# Patient Record
Sex: MALE | Race: WHITE | NOT HISPANIC OR LATINO | Employment: OTHER | URBAN - METROPOLITAN AREA
[De-identification: names, ages, dates, MRNs, and addresses within clinical notes are randomized per-mention and may not be internally consistent; named-entity substitution may affect disease eponyms.]

---

## 2019-01-08 ENCOUNTER — OFFICE VISIT (OUTPATIENT)
Dept: FAMILY MEDICINE CLINIC | Facility: CLINIC | Age: 69
End: 2019-01-08

## 2019-01-08 VITALS
SYSTOLIC BLOOD PRESSURE: 150 MMHG | DIASTOLIC BLOOD PRESSURE: 92 MMHG | WEIGHT: 150.6 LBS | BODY MASS INDEX: 28.43 KG/M2 | HEART RATE: 56 BPM | HEIGHT: 61 IN | OXYGEN SATURATION: 97 % | TEMPERATURE: 98.1 F

## 2019-01-08 DIAGNOSIS — R35.0 BENIGN PROSTATIC HYPERPLASIA WITH URINARY FREQUENCY: Primary | ICD-10-CM

## 2019-01-08 DIAGNOSIS — F51.01 PRIMARY INSOMNIA: ICD-10-CM

## 2019-01-08 DIAGNOSIS — E55.9 VITAMIN D DEFICIENCY: ICD-10-CM

## 2019-01-08 DIAGNOSIS — Z86.79 HISTORY OF HYPERTENSION: ICD-10-CM

## 2019-01-08 DIAGNOSIS — N40.1 BENIGN PROSTATIC HYPERPLASIA WITH URINARY FREQUENCY: Primary | ICD-10-CM

## 2019-01-08 DIAGNOSIS — E78.5 HYPERLIPIDEMIA, UNSPECIFIED HYPERLIPIDEMIA TYPE: ICD-10-CM

## 2019-01-08 PROCEDURE — 99204 OFFICE O/P NEW MOD 45 MIN: CPT | Performed by: INTERNAL MEDICINE

## 2019-01-08 RX ORDER — ALFUZOSIN HYDROCHLORIDE 10 MG/1
10 TABLET, EXTENDED RELEASE ORAL NIGHTLY
COMMUNITY

## 2019-01-08 RX ORDER — PRAVASTATIN SODIUM 40 MG
40 TABLET ORAL NIGHTLY
COMMUNITY

## 2019-01-08 RX ORDER — FINASTERIDE 5 MG/1
5 TABLET, FILM COATED ORAL DAILY
COMMUNITY

## 2019-01-08 RX ORDER — GABAPENTIN 300 MG/1
300 CAPSULE ORAL NIGHTLY
COMMUNITY

## 2019-01-08 NOTE — PROGRESS NOTES
Chief Complaint:  [facility], htn, bph    HPI:  Elliot Jean Baptiste is a 68 y.o. male who presents today to [facility]. He has a history of bph, currently on combination therapy. Hx of htn, currently not on medication. He is taking a statin to prevent dementia per previous pcp. He has no complaints today. [facility]. His BP at home is usually 120-130/80.     ROS:  Constitutional: no fevers, night sweats or unexplained weight loss  Eyes: no vision changes  ENT: no runny nose, ear pain, sore throat  Cardio: no chest pain, palpitations  Pulm: no shortness of breath, wheezing, or cough  GI: no abdominal pain or changes in bowel movements  : no difficulty urinating  MSK: no difficulty ambulating, no joint pain  Neuro: no weakness, dizziness or headache  Psych: no trouble sleeping  Endo: no change in appetite      Past Medical History:   Diagnosis Date   • Arthritis    • Cancer (CMS/HCC)     skin   • Colon polyp    • Depression    • Enlarged prostate    • Gallstones    • History of medical problems     poor blood clotting    • Hypertension    • Kidney infection    • Kidney stone    • Migraine       Family History   Problem Relation Age of Onset   • Cancer Mother    • Hypertension Mother    • Migraines Mother    • Osteoporosis Mother    • Hypertension Father    • Hyperlipidemia Father    • Migraines Sister       Social History     Socioeconomic History   • Marital status:      Spouse name: Not on file   • Number of children: Not on file   • Years of education: Not on file   • Highest education level: Not on file   Social Needs   • Financial resource strain: Not on file   • Food insecurity - worry: Not on file   • Food insecurity - inability: Not on file   • Transportation needs - medical: Not on file   • Transportation needs - non-medical: Not on file   Occupational History   • Not on file   Tobacco Use   • Smoking status: Never Smoker   • Smokeless  tobacco: Never Used   Substance and Sexual Activity   • Alcohol use: Yes     Comment: SOCAIL    • Drug use: No   • Sexual activity: No   Other Topics Concern   • Not on file   Social History Narrative   • Not on file      No Known Allergies     There is no immunization history on file for this patient.     PE:  Vitals:    01/08/19 1343   BP: 150/92   Pulse: 56   Temp: 98.1 °F (36.7 °C)   SpO2: 97%        Gen Appearance: NAD  HEENT: Normocephalic, PERRLA, no thyromegaly, trache midline  Heart: RRR, normal S1 and S2, no murmur  Lungs: CTA b/l, no wheezing, no crackles  Abdomen: Soft, non-tender, non-distended, no guarding and BSx4  MSK: Moves all extremities well, normal gait, no peripheral edema  Pulses: Palpable and equal b/l  Lymph nodes: No palpable lymphadenopathy   Neuro: No focal deficits      Current Outpatient Medications   Medication Sig Dispense Refill   • alfuzosin (UROXATRAL) 10 MG 24 hr tablet Take 10 mg by mouth Every Night.     • Cholecalciferol (VITAMIN D3) 5000 units capsule capsule Take 5,000 Units by mouth Daily.     • finasteride (PROSCAR) 5 MG tablet Take 5 mg by mouth Daily.     • gabapentin (NEURONTIN) 300 MG capsule Take 300 mg by mouth Every Night.     • pravastatin (PRAVACHOL) 40 MG tablet Take 40 mg by mouth Every Night.       No current facility-administered medications for this visit.         Elliot was seen today for establish care.    Diagnoses and all orders for this visit:    Benign prostatic hyperplasia with urinary frequency  Well controlled on combination therapy. Will check PSA and prostate exam at medicare wellness exam.  Vitamin D deficiency  Will check level at wellness exam. He takes 5000 units daily.  Primary insomnia  Currently taking gabapentin for insomnia about once per week per [facility].   Hyperlipidemia, unspecified hyperlipidemia type  Pt states his cholesterol was good before taking statin but he is taking pravastatin for dementia prevention. Will stop statin  now, recheck in 3 months. No history of heart disease, smoking and no family hx of early heart disease.   Hypertension   He has a history of hypertension previously taking losartan. Slightly elevated today at 150/92. Recommend checking BP at home and at pharmacy over the next few months. He reports readings of 120-130 systolic of recent.     Return in about 3 months (around 4/8/2019) for Medicare Wellness.

## 2019-04-09 ENCOUNTER — OFFICE VISIT (OUTPATIENT)
Dept: FAMILY MEDICINE CLINIC | Facility: CLINIC | Age: 69
End: 2019-04-09

## 2019-04-09 ENCOUNTER — APPOINTMENT (OUTPATIENT)
Dept: LAB | Facility: HOSPITAL | Age: 69
End: 2019-04-09

## 2019-04-09 VITALS
SYSTOLIC BLOOD PRESSURE: 144 MMHG | DIASTOLIC BLOOD PRESSURE: 76 MMHG | TEMPERATURE: 97.1 F | BODY MASS INDEX: 28.25 KG/M2 | HEART RATE: 67 BPM | RESPIRATION RATE: 10 BRPM | HEIGHT: 61 IN | WEIGHT: 149.6 LBS | OXYGEN SATURATION: 99 %

## 2019-04-09 DIAGNOSIS — E55.9 VITAMIN D DEFICIENCY: ICD-10-CM

## 2019-04-09 DIAGNOSIS — Z00.00 PREVENTATIVE HEALTH CARE: Primary | ICD-10-CM

## 2019-04-09 DIAGNOSIS — Z12.5 PROSTATE CANCER SCREENING: ICD-10-CM

## 2019-04-09 DIAGNOSIS — R35.0 BENIGN PROSTATIC HYPERPLASIA WITH URINARY FREQUENCY: ICD-10-CM

## 2019-04-09 DIAGNOSIS — M62.838 MUSCLE SPASM: ICD-10-CM

## 2019-04-09 DIAGNOSIS — D17.9 LIPOMA, UNSPECIFIED SITE: ICD-10-CM

## 2019-04-09 DIAGNOSIS — N40.1 BENIGN PROSTATIC HYPERPLASIA WITH URINARY FREQUENCY: ICD-10-CM

## 2019-04-09 LAB
ALBUMIN SERPL-MCNC: 4.6 G/DL (ref 3.5–5.2)
ALBUMIN/GLOB SERPL: 1.5 G/DL
ALP SERPL-CCNC: 62 U/L (ref 39–117)
ALT SERPL W P-5'-P-CCNC: 15 U/L (ref 1–41)
ANION GAP SERPL CALCULATED.3IONS-SCNC: 11.9 MMOL/L
AST SERPL-CCNC: 17 U/L (ref 1–40)
BILIRUB SERPL-MCNC: 2.4 MG/DL (ref 0.2–1.2)
BUN BLD-MCNC: 24 MG/DL (ref 8–23)
BUN/CREAT SERPL: 16 (ref 7–25)
CALCIUM SPEC-SCNC: 10 MG/DL (ref 8.6–10.5)
CHLORIDE SERPL-SCNC: 102 MMOL/L (ref 98–107)
CHOLEST SERPL-MCNC: 197 MG/DL (ref 0–200)
CO2 SERPL-SCNC: 26.1 MMOL/L (ref 22–29)
CREAT BLD-MCNC: 1.5 MG/DL (ref 0.76–1.27)
DEPRECATED RDW RBC AUTO: 39.1 FL (ref 37–54)
ERYTHROCYTE [DISTWIDTH] IN BLOOD BY AUTOMATED COUNT: 12 % (ref 12.3–15.4)
GFR SERPL CREATININE-BSD FRML MDRD: 46 ML/MIN/1.73
GLOBULIN UR ELPH-MCNC: 3 GM/DL
GLUCOSE BLD-MCNC: 107 MG/DL (ref 65–99)
HBA1C MFR BLD: 5.5 % (ref 4.8–5.6)
HCT VFR BLD AUTO: 42.5 % (ref 37.5–51)
HDLC SERPL-MCNC: 53 MG/DL (ref 40–60)
HGB BLD-MCNC: 14.3 G/DL (ref 13–17.7)
LDLC SERPL CALC-MCNC: 113 MG/DL (ref 0–100)
LDLC/HDLC SERPL: 2.12 {RATIO}
MCH RBC QN AUTO: 29.9 PG (ref 26.6–33)
MCHC RBC AUTO-ENTMCNC: 33.6 G/DL (ref 31.5–35.7)
MCV RBC AUTO: 88.7 FL (ref 79–97)
PLATELET # BLD AUTO: 193 10*3/MM3 (ref 140–450)
PMV BLD AUTO: 11.5 FL (ref 6–12)
POTASSIUM BLD-SCNC: 5 MMOL/L (ref 3.5–5.2)
PROT SERPL-MCNC: 7.6 G/DL (ref 6–8.5)
PSA SERPL-MCNC: 4.25 NG/ML (ref 0–4)
RBC # BLD AUTO: 4.79 10*6/MM3 (ref 4.14–5.8)
SODIUM BLD-SCNC: 140 MMOL/L (ref 136–145)
TRIGL SERPL-MCNC: 157 MG/DL (ref 0–150)
VLDLC SERPL-MCNC: 31.4 MG/DL (ref 5–40)
WBC NRBC COR # BLD: 5.82 10*3/MM3 (ref 3.4–10.8)

## 2019-04-09 PROCEDURE — 83036 HEMOGLOBIN GLYCOSYLATED A1C: CPT | Performed by: INTERNAL MEDICINE

## 2019-04-09 PROCEDURE — G0103 PSA SCREENING: HCPCS | Performed by: INTERNAL MEDICINE

## 2019-04-09 PROCEDURE — 85027 COMPLETE CBC AUTOMATED: CPT | Performed by: INTERNAL MEDICINE

## 2019-04-09 PROCEDURE — 80053 COMPREHEN METABOLIC PANEL: CPT | Performed by: INTERNAL MEDICINE

## 2019-04-09 PROCEDURE — 36415 COLL VENOUS BLD VENIPUNCTURE: CPT | Performed by: INTERNAL MEDICINE

## 2019-04-09 PROCEDURE — 82306 VITAMIN D 25 HYDROXY: CPT | Performed by: INTERNAL MEDICINE

## 2019-04-09 PROCEDURE — 80061 LIPID PANEL: CPT | Performed by: INTERNAL MEDICINE

## 2019-04-09 PROCEDURE — G0439 PPPS, SUBSEQ VISIT: HCPCS | Performed by: INTERNAL MEDICINE

## 2019-04-09 RX ORDER — CYCLOBENZAPRINE HCL 10 MG
10 TABLET ORAL 2 TIMES DAILY PRN
Qty: 15 TABLET | Refills: 0 | Status: SHIPPED | OUTPATIENT
Start: 2019-04-09

## 2019-04-09 NOTE — PROGRESS NOTES
QUICK REFERENCE INFORMATION:  The ABCs of the Annual Wellness Visit  Elliot Jean Baptiste is a 69 [facility]  [facility]icare Wellness-subsequent (3 MTH )  .     Medicare Subsequent Wellness Visit    Chief Complaint   Patient presents with   • Medicare Wellness-subsequent     3 MTH         HPI   Complaining of right-sided leg cramping and pain.  Ongoing for 3 weeks now.  No improvement with anti-inflammatories or muscle relaxers.  Worsens with activity.  ROS:  Constitutional: no fevers, night sweats or unexplained weight loss  Eyes: no vision changes  ENT: no runny nose, ear pain, sore throat  Cardio: no chest pain, palpitations  Pulm: no shortness of breath, wheezing, or cough  GI: no abdominal pain or changes in bowel movements  : no difficulty urinating  MSK: no difficulty ambulating, no joint pain  Neuro: no weakness, dizziness or headache  Psych: no trouble sleeping  Endo: no change in appetite     Past Medical History:   Diagnosis Date   • Arthritis    • Cancer (CMS/HCC)     skin   • Colon polyp    • Depression    • Enlarged prostate    • Gallstones    • History of medical problems     poor blood clotting    • Hypertension    • Kidney infection    • Kidney stone    • Migraine         Past Surgical History:   Procedure Laterality Date   • APPENDECTOMY     • CATARACT EXTRACTION Bilateral    • GALLBLADDER SURGERY     • HEMORRHOIDECTOMY     • KIDNEY STONE SURGERY     • KNEE CARTILAGE SURGERY Left    • ULNAR NERVE REPAIR Left        Family History   Problem Relation Age of Onset   • Cancer Mother    • Hypertension Mother    • Migraines Mother    • Osteoporosis Mother    • Hypertension Father    • Hyperlipidemia Father    • Migraines Sister         Social History     Socioeconomic History   • Marital status:      Spouse name: Not on file   • Number of children: Not on file   • Years of education: Not on file   • Highest education level: Not on file   Tobacco Use   •  "Smoking status: Never Smoker   • Smokeless tobacco: Never Used   Substance and Sexual Activity   • Alcohol use: Yes     Comment: SOCAIL    • Drug use: No   • Sexual activity: No        Current Outpatient Medications   Medication Sig Dispense Refill   • alfuzosin (UROXATRAL) 10 MG 24 hr tablet Take 10 mg by mouth Every Night.     • Cholecalciferol (VITAMIN D3) 5000 units capsule capsule Take 5,000 Units by mouth Daily.     • finasteride (PROSCAR) 5 MG tablet Take 5 mg by mouth Daily.     • gabapentin (NEURONTIN) 300 MG capsule Take 300 mg by mouth Every Night.     • pravastatin (PRAVACHOL) 40 MG tablet Take 40 mg by mouth Every Night.     • cyclobenzaprine (FLEXERIL) 10 MG tablet Take 1 tablet by mouth 2 (Two) Times a Day As Needed for Muscle Spasms. 15 tablet 0     No current facility-administered medications for this visit.         No Known Allergies     Immunization History   Administered Date(s) Administered   • Pneumococcal Conjugate 13-Valent (PCV13) 11/04/2014   • Zostavax 05/01/2014        The following portions of the patient's history were reviewed and updated as appropriate: allergies, current medications, past family history, past medical history, past social history, past surgical history and problem list.      Objective    Visit Vitals  /76 (BP Location: Left arm, Patient Position: Sitting, Cuff Size: Adult)   Pulse 67   Temp 97.1 °F (36.2 °C) (Temporal)   Resp 10   Ht 154.9 cm (61\")   Wt 67.9 kg (149 lb 9.6 oz)   SpO2 99%   BMI 28.27 kg/m²        Physical Exam  Gen Appearance: NAD  HEENT: Normocephalic, PERRLA, no thyromegaly, trache midline  Heart: RRR, normal S1 and S2, no murmur  Lungs: CTA b/l, no wheezing, no crackles  Abdomen: Soft, non-tender, non-distended, no guarding and BSx4  MSK: Moves all extremities well, normal gait, no peripheral edema  Pulses: Palpable and equal b/l  Lymph nodes: No palpable lymphadenopathy   Neuro: No focal deficits       HEALTH RISK " ASSESSMENT    1950    Recent Hospitalizations:  No hospitalization(s) within the last year..      Current Medical Providers:  Patient Care Team:  Dandy Teran DO as PCP - General (Internal Medicine)      Smoking Status:  Social History     Tobacco Use   Smoking Status Never Smoker   Smokeless Tobacco Never Used       Alcohol Consumption:  Social History     Substance and Sexual Activity   Alcohol Use Yes    Comment: SOCAIL        Depression Screen:   PHQ-2/PHQ-9 Depression Screening 4/9/2019   Little interest or pleasure in doing things 0   Feeling down, depressed, or hopeless 0   Total Score 0       Health Habits and Functional and Cognitive Screening:  Functional & Cognitive Status 4/9/2019   Do you have difficulty preparing food and eating? No   Do you have difficulty bathing yourself, getting dressed or grooming yourself? No   Do you have difficulty using the toilet? No   Do you have difficulty moving around from place to place? No   Do you have trouble with steps or getting out of a bed or a chair? No   In the past year have you fallen or experienced a near fall? No   Current Diet Well Balanced Diet   Dental Exam Up to date   Eye Exam Up to date   Exercise (times per week) 7 times per week   Current Exercise Activities Include Aerobics   Do you need help using the phone?  No   Are you deaf or do you have serious difficulty hearing?  No   Do you need help with transportation? No   Do you need help shopping? No   Do you need help preparing meals?  No   Do you need help with housework?  No   Do you need help with laundry? No   Do you need help taking your medications? No   Do you need help managing money? No   Do you ever drive or ride in a car without wearing a seat belt? No   Have you felt unusual stress, anger or loneliness in the last month? No   Who do you live with? Spouse   If you need help, do you have trouble finding someone available to you? No   Have you been bothered in the last four  weeks by sexual problems? No   Do you have difficulty concentrating, remembering or making decisions? No         Does the patient have evidence of cognitive impairment? No    Aspirin use counseling? Does not need ASA (and currently is not on it)      Recent Lab Results:  CMP:     Lipid Panel:     HbA1c:       Visual Acuity:  No exam data present    Age-appropriate Screening Schedule:  Refer to the list below for future screening recommendations based on patient's age, sex and/or medical conditions. Orders for these recommended tests are listed in the plan section. The patient has been provided with a written plan.    Health Maintenance   Topic Date Due   • TDAP/TD VACCINES (1 - Tdap) 02/16/1967   • ZOSTER VACCINE (2 of 3) 06/26/2014   • PNEUMOCOCCAL VACCINES (65+ LOW/MEDIUM RISK) (2 of 2 - PPSV23) 11/04/2015   • LIPID PANEL  01/08/2019   • COLONOSCOPY  01/08/2019   • INFLUENZA VACCINE  08/01/2019          Advance Care Planning:  Patient has an advance directive - a copy has not been provided. Have asked the patient to send this to us to add to record    Identification of Risk Factors:  Risk factors include: Cardiovascular risk;  Patient advised to follow heart healthy diet to help decrease cardiovascular risk .    Compared to one year ago, the patient feels his physical health is the same.  Compared to one year ago, the patient feels his mental health is the same.  Reviewed use of high risk medication in the elderly: yes  Reviewed for potential of harmful drug interactions in the elderly: not applicable    Elliot was seen today for medicare wellness-subsequent.    Diagnoses and all orders for this visit:    Preventative health care  -     CBC (No Diff); Future  -     Comprehensive Metabolic Panel; Future  -     Lipid Panel; Future  -     Hemoglobin A1c; Future  -     CBC (No Diff)  -     Comprehensive Metabolic Panel  -     Lipid Panel  -     Hemoglobin A1c    Vitamin D deficiency  -     Vitamin D 25 Hydroxy;  Future  -     Vitamin D 25 Hydroxy    Benign prostatic hyperplasia with urinary frequency  Stable.  Continue current medication.  Prostate cancer screening  -     PSA SCREENING; Future  -     PSA SCREENING    Muscle spasm  -     cyclobenzaprine (FLEXERIL) 10 MG tablet; Take 1 tablet by mouth 2 (Two) Times a Day As Needed for Muscle Spasms.  -     Ambulatory Referral to Physical Therapy Evaluate and treat    Lipoma, unspecified site  -     Ambulatory Referral to General Surgery          Patient Self-Management and Personalized Health Advice  The patient has been provided with information about: diet, exercise, weight management and prevention of cardiac or vascular disease and preventive services including:   · Cardiovascular Disease Screening Tests (may do this order every 5 years in beneficiaries without signs or symptoms of cardiovascular disease).      Follow Up:  Return in about 1 year (around 4/9/2020) for Medicare Wellness.     An After Visit Summary and PPPS with all of these plans were given to the patient.

## 2019-04-10 LAB — 25(OH)D3 SERPL-MCNC: 89.5 NG/ML (ref 30–100)

## 2019-05-08 ENCOUNTER — LAB REQUISITION (OUTPATIENT)
Dept: LAB | Facility: HOSPITAL | Age: 69
End: 2019-05-08

## 2019-05-08 DIAGNOSIS — M79.9 SOFT TISSUE DISORDER: ICD-10-CM

## 2019-05-08 PROCEDURE — 88304 TISSUE EXAM BY PATHOLOGIST: CPT | Performed by: SURGERY

## 2019-05-09 LAB
CYTO UR: NORMAL
LAB AP CASE REPORT: NORMAL
LAB AP CLINICAL INFORMATION: NORMAL
PATH REPORT.FINAL DX SPEC: NORMAL
PATH REPORT.GROSS SPEC: NORMAL

## 2023-09-12 ENCOUNTER — TRANSCRIBE ORDERS (OUTPATIENT)
Dept: ADMINISTRATIVE | Facility: HOSPITAL | Age: 73
End: 2023-09-12
Payer: MEDICARE

## 2023-09-12 DIAGNOSIS — N28.89 OTHER SPECIFIED DISORDERS OF KIDNEY AND URETER: Primary | ICD-10-CM

## 2023-10-11 ENCOUNTER — OFFICE VISIT (OUTPATIENT)
Dept: FAMILY MEDICINE CLINIC | Facility: CLINIC | Age: 73
End: 2023-10-11
Payer: MEDICARE

## 2023-10-11 ENCOUNTER — LAB (OUTPATIENT)
Dept: LAB | Facility: HOSPITAL | Age: 73
End: 2023-10-11
Payer: MEDICARE

## 2023-10-11 VITALS
SYSTOLIC BLOOD PRESSURE: 138 MMHG | HEART RATE: 66 BPM | BODY MASS INDEX: 27.83 KG/M2 | HEIGHT: 61 IN | OXYGEN SATURATION: 97 % | DIASTOLIC BLOOD PRESSURE: 78 MMHG | WEIGHT: 147.4 LBS

## 2023-10-11 DIAGNOSIS — R35.0 BENIGN PROSTATIC HYPERPLASIA WITH URINARY FREQUENCY: ICD-10-CM

## 2023-10-11 DIAGNOSIS — Z23 INFLUENZA VACCINE NEEDED: ICD-10-CM

## 2023-10-11 DIAGNOSIS — N40.1 BENIGN PROSTATIC HYPERPLASIA WITH URINARY FREQUENCY: ICD-10-CM

## 2023-10-11 DIAGNOSIS — Z13.6 SCREENING FOR CARDIOVASCULAR CONDITION: ICD-10-CM

## 2023-10-11 DIAGNOSIS — N28.89 KIDNEY MASS: ICD-10-CM

## 2023-10-11 DIAGNOSIS — R79.9 ABNORMAL FINDING OF BLOOD CHEMISTRY, UNSPECIFIED: ICD-10-CM

## 2023-10-11 DIAGNOSIS — Z12.11 COLON CANCER SCREENING: ICD-10-CM

## 2023-10-11 DIAGNOSIS — Z00.00 MEDICARE ANNUAL WELLNESS VISIT, SUBSEQUENT: Primary | ICD-10-CM

## 2023-10-11 DIAGNOSIS — E55.9 VITAMIN D DEFICIENCY: ICD-10-CM

## 2023-10-11 DIAGNOSIS — E78.5 DYSLIPIDEMIA: ICD-10-CM

## 2023-10-11 LAB
25(OH)D3 SERPL-MCNC: 54.3 NG/ML (ref 30–100)
ALBUMIN SERPL-MCNC: 4.2 G/DL (ref 3.5–5.2)
ALBUMIN/GLOB SERPL: 1.6 G/DL
ALP SERPL-CCNC: 68 U/L (ref 39–117)
ALT SERPL W P-5'-P-CCNC: 16 U/L (ref 1–41)
ANION GAP SERPL CALCULATED.3IONS-SCNC: 8.4 MMOL/L (ref 5–15)
AST SERPL-CCNC: 19 U/L (ref 1–40)
BASOPHILS # BLD AUTO: 0.04 10*3/MM3 (ref 0–0.2)
BASOPHILS NFR BLD AUTO: 1.1 % (ref 0–1.5)
BILIRUB SERPL-MCNC: 1.4 MG/DL (ref 0–1.2)
BILIRUB UR QL STRIP: NEGATIVE
BUN SERPL-MCNC: 21 MG/DL (ref 8–23)
BUN/CREAT SERPL: 14.2 (ref 7–25)
CALCIUM SPEC-SCNC: 9.7 MG/DL (ref 8.6–10.5)
CHLORIDE SERPL-SCNC: 107 MMOL/L (ref 98–107)
CHOLEST SERPL-MCNC: 141 MG/DL (ref 0–200)
CLARITY UR: CLEAR
CO2 SERPL-SCNC: 25.6 MMOL/L (ref 22–29)
COLOR UR: YELLOW
CREAT SERPL-MCNC: 1.48 MG/DL (ref 0.76–1.27)
DEPRECATED RDW RBC AUTO: 39.2 FL (ref 37–54)
EGFRCR SERPLBLD CKD-EPI 2021: 49 ML/MIN/1.73
EOSINOPHIL # BLD AUTO: 0.17 10*3/MM3 (ref 0–0.4)
EOSINOPHIL NFR BLD AUTO: 4.7 % (ref 0.3–6.2)
ERYTHROCYTE [DISTWIDTH] IN BLOOD BY AUTOMATED COUNT: 12 % (ref 12.3–15.4)
GLOBULIN UR ELPH-MCNC: 2.7 GM/DL
GLUCOSE SERPL-MCNC: 125 MG/DL (ref 65–99)
GLUCOSE UR STRIP-MCNC: NEGATIVE MG/DL
HBA1C MFR BLD: 5.8 % (ref 4.8–5.6)
HCT VFR BLD AUTO: 39.1 % (ref 37.5–51)
HDLC SERPL-MCNC: 53 MG/DL (ref 40–60)
HGB BLD-MCNC: 13.8 G/DL (ref 13–17.7)
HGB UR QL STRIP.AUTO: NEGATIVE
HOLD SPECIMEN: NORMAL
IMM GRANULOCYTES # BLD AUTO: 0.01 10*3/MM3 (ref 0–0.05)
IMM GRANULOCYTES NFR BLD AUTO: 0.3 % (ref 0–0.5)
KETONES UR QL STRIP: NEGATIVE
LDLC SERPL CALC-MCNC: 70 MG/DL (ref 0–100)
LDLC/HDLC SERPL: 1.28 {RATIO}
LEUKOCYTE ESTERASE UR QL STRIP.AUTO: NEGATIVE
LYMPHOCYTES # BLD AUTO: 1.35 10*3/MM3 (ref 0.7–3.1)
LYMPHOCYTES NFR BLD AUTO: 37.3 % (ref 19.6–45.3)
MCH RBC QN AUTO: 31.4 PG (ref 26.6–33)
MCHC RBC AUTO-ENTMCNC: 35.3 G/DL (ref 31.5–35.7)
MCV RBC AUTO: 89.1 FL (ref 79–97)
MONOCYTES # BLD AUTO: 0.33 10*3/MM3 (ref 0.1–0.9)
MONOCYTES NFR BLD AUTO: 9.1 % (ref 5–12)
NEUTROPHILS NFR BLD AUTO: 1.72 10*3/MM3 (ref 1.7–7)
NEUTROPHILS NFR BLD AUTO: 47.5 % (ref 42.7–76)
NITRITE UR QL STRIP: NEGATIVE
NRBC BLD AUTO-RTO: 0 /100 WBC (ref 0–0.2)
PH UR STRIP.AUTO: 5.5 [PH] (ref 5–8)
PLATELET # BLD AUTO: 165 10*3/MM3 (ref 140–450)
PMV BLD AUTO: 10.9 FL (ref 6–12)
POTASSIUM SERPL-SCNC: 4.8 MMOL/L (ref 3.5–5.2)
PROT SERPL-MCNC: 6.9 G/DL (ref 6–8.5)
PROT UR QL STRIP: NEGATIVE
RBC # BLD AUTO: 4.39 10*6/MM3 (ref 4.14–5.8)
SODIUM SERPL-SCNC: 141 MMOL/L (ref 136–145)
SP GR UR STRIP: 1.02 (ref 1–1.03)
TRIGL SERPL-MCNC: 100 MG/DL (ref 0–150)
UROBILINOGEN UR QL STRIP: NORMAL
VLDLC SERPL-MCNC: 18 MG/DL (ref 5–40)
WBC NRBC COR # BLD: 3.62 10*3/MM3 (ref 3.4–10.8)

## 2023-10-11 PROCEDURE — 80053 COMPREHEN METABOLIC PANEL: CPT | Performed by: INTERNAL MEDICINE

## 2023-10-11 PROCEDURE — 83036 HEMOGLOBIN GLYCOSYLATED A1C: CPT | Performed by: INTERNAL MEDICINE

## 2023-10-11 PROCEDURE — 80061 LIPID PANEL: CPT | Performed by: INTERNAL MEDICINE

## 2023-10-11 PROCEDURE — G0439 PPPS, SUBSEQ VISIT: HCPCS | Performed by: INTERNAL MEDICINE

## 2023-10-11 PROCEDURE — G0008 ADMIN INFLUENZA VIRUS VAC: HCPCS | Performed by: INTERNAL MEDICINE

## 2023-10-11 PROCEDURE — 1170F FXNL STATUS ASSESSED: CPT | Performed by: INTERNAL MEDICINE

## 2023-10-11 PROCEDURE — 1160F RVW MEDS BY RX/DR IN RCRD: CPT | Performed by: INTERNAL MEDICINE

## 2023-10-11 PROCEDURE — 90662 IIV NO PRSV INCREASED AG IM: CPT | Performed by: INTERNAL MEDICINE

## 2023-10-11 PROCEDURE — 81003 URINALYSIS AUTO W/O SCOPE: CPT | Performed by: INTERNAL MEDICINE

## 2023-10-11 PROCEDURE — 85025 COMPLETE CBC W/AUTO DIFF WBC: CPT | Performed by: INTERNAL MEDICINE

## 2023-10-11 PROCEDURE — 1159F MED LIST DOCD IN RCRD: CPT | Performed by: INTERNAL MEDICINE

## 2023-10-11 PROCEDURE — 82306 VITAMIN D 25 HYDROXY: CPT | Performed by: INTERNAL MEDICINE

## 2023-10-11 RX ORDER — PRAVASTATIN SODIUM 40 MG
40 TABLET ORAL NIGHTLY
Qty: 90 TABLET | Refills: 3 | Status: SHIPPED | OUTPATIENT
Start: 2023-10-11

## 2023-10-11 RX ORDER — TAMSULOSIN HYDROCHLORIDE 0.4 MG/1
1 CAPSULE ORAL DAILY
COMMUNITY
Start: 2021-06-15

## 2023-10-11 RX ORDER — CEPHALEXIN 500 MG/1
500 CAPSULE ORAL 2 TIMES DAILY
COMMUNITY
Start: 2023-10-09

## 2023-10-11 NOTE — PROGRESS NOTES
The ABCs of the Annual Wellness Visit  Subsequent Medicare Wellness Visit    Subjective      Elliot Jean Baptiste is a 73 y.o. male who presents for a Subsequent Medicare Wellness Visit.    The following portions of the patient's history were reviewed and   updated as appropriate: allergies, current medications, past family history, past medical history, past social history, past surgical history, and problem list.    Compared to one year ago, the patient feels his physical   health is the same.    Compared to one year ago, the patient feels his mental   health is the same.    Recent Hospitalizations:  He was not admitted to the hospital during the last year.       Current Medical Providers:  Patient Care Team:  Dandy Teran,  as PCP - General (Internal Medicine)    Outpatient Medications Prior to Visit   Medication Sig Dispense Refill    alfuzosin (UROXATRAL) 10 MG 24 hr tablet Take 1 tablet by mouth Every Night.      cephalexin (KEFLEX) 500 MG capsule Take 1 capsule by mouth 2 (Two) Times a Day.      Cholecalciferol (VITAMIN D3) 5000 units capsule capsule Take 1 capsule by mouth Daily.      finasteride (PROSCAR) 5 MG tablet Take 1 tablet by mouth Daily.      tamsulosin (FLOMAX) 0.4 MG capsule 24 hr capsule Take 1 capsule by mouth Daily.      pravastatin (PRAVACHOL) 40 MG tablet Take 1 tablet by mouth Every Night.      clotrimazole-betamethasone (LOTRISONE) 1-0.05 % cream Apply  topically to the appropriate area as directed 2 (Two) Times a Day. 30 g 0    cyclobenzaprine (FLEXERIL) 10 MG tablet Take 1 tablet by mouth 2 (Two) Times a Day As Needed for Muscle Spasms. 15 tablet 0    gabapentin (NEURONTIN) 300 MG capsule Take 1 capsule by mouth Every Night.      predniSONE (DELTASONE) 10 MG tablet Daily taper - 6/5/4/3/2/1 21 tablet 0     No facility-administered medications prior to visit.       No opioid medication identified on active medication list. I have reviewed chart for other potential  high risk  "medication/s and harmful drug interactions in the elderly.        Aspirin is not on active medication list.  Aspirin use is not indicated based on review of current medical condition/s. Risk of harm outweighs potential benefits.  .    Patient Active Problem List   Diagnosis    Benign prostatic hyperplasia with urinary frequency    Vitamin D deficiency    Primary insomnia    History of hypertension     Advance Care Planning   Advance Care Planning     Advance Directive is not on file.  ACP discussion was held with the patient during this visit. Patient does not have an advance directive, declines further assistance.     Objective    Vitals:    10/11/23 1013   BP: 138/78   BP Location: Left arm   Patient Position: Sitting   Cuff Size: Adult   Pulse: 66   SpO2: 97%   Weight: 66.9 kg (147 lb 6.4 oz)   Height: 154.9 cm (61\")     Estimated body mass index is 27.85 kg/mý as calculated from the following:    Height as of this encounter: 154.9 cm (61\").    Weight as of this encounter: 66.9 kg (147 lb 6.4 oz).    BMI is >= 25 and <30. (Overweight) The following options were offered after discussion;: exercise counseling/recommendations and nutrition counseling/recommendations      Does the patient have evidence of cognitive impairment?   No            HEALTH RISK ASSESSMENT    Smoking Status:  Social History     Tobacco Use   Smoking Status Never   Smokeless Tobacco Never     Alcohol Consumption:  Social History     Substance and Sexual Activity   Alcohol Use Yes    Comment: SOCAIL      Fall Risk Screen:    STEADI Fall Risk Assessment was completed, and patient is at LOW risk for falls.Assessment completed on:10/11/2023    Depression Screening:      10/11/2023    10:14 AM   PHQ-2/PHQ-9 Depression Screening   Little Interest or Pleasure in Doing Things 0-->not at all   Feeling Down, Depressed or Hopeless 0-->not at all   PHQ-9: Brief Depression Severity Measure Score 0       Health Habits and Functional and Cognitive " Screening:      10/11/2023    10:00 AM   Functional & Cognitive Status   Do you have difficulty preparing food and eating? No   Do you have difficulty bathing yourself, getting dressed or grooming yourself? No   Do you have difficulty using the toilet? No   Do you have difficulty moving around from place to place? No   Do you have trouble with steps or getting out of a bed or a chair? No   Current Diet Well Balanced Diet   Dental Exam Up to date   Eye Exam Up to date   Exercise (times per week) 3 times per week   Current Exercises Include Aerobics;Swimming   Do you need help using the phone?  No   Are you deaf or do you have serious difficulty hearing?  Yes   Do you need help to go to places out of walking distance? No   Do you need help shopping? No   Do you need help preparing meals?  No   Do you need help with housework?  No   Do you need help with laundry? No   Do you need help taking your medications? No   Do you need help managing money? No   Do you ever drive or ride in a car without wearing a seat belt? Yes   Have you felt unusual stress, anger or loneliness in the last month? No   Who do you live with? Spouse   If you need help, do you have trouble finding someone available to you? No   Have you been bothered in the last four weeks by sexual problems? No   Do you have difficulty concentrating, remembering or making decisions? No       Age-appropriate Screening Schedule:  Refer to the list below for future screening recommendations based on patient's age, sex and/or medical conditions. Orders for these recommended tests are listed in the plan section. The patient has been provided with a written plan.    Health Maintenance   Topic Date Due    BMI FOLLOWUP  Never done    ZOSTER VACCINE (2 of 3) 06/26/2014    HEPATITIS C SCREENING  Never done    ANNUAL WELLNESS VISIT  04/09/2020    LIPID PANEL  04/09/2020    COVID-19 Vaccine (2 - 2023-24 season) 12/31/2023 (Originally 9/1/2023)    Pneumococcal Vaccine 65+ (2 -  PPSV23 or PCV20) 10/11/2024 (Originally 11/4/2015)    TDAP/TD VACCINES (1 - Tdap) 10/11/2024 (Originally 2/16/1969)    COLORECTAL CANCER SCREENING  02/26/2028    INFLUENZA VACCINE  Completed                Physical Exam  Gen Appearance: NAD  HEENT: Normocephalic, PERRLA, no thyromegaly, trache midline  Heart: RRR, normal S1 and S2, no murmur  Lungs: CTA b/l, no wheezing, no crackles  Abdomen: Soft, non-tender, non-distended, no guarding and BSx4  MSK: Moves all extremities well, normal gait, no peripheral edema  Pulses: Palpable and equal b/l  Lymph nodes: No palpable lymphadenopathy   Neuro: No focal deficits     CMS Preventative Services Quick Reference  Risk Factors Identified During Encounter:    None Identified    The above risks/problems have been discussed with the patient.  Pertinent information has been shared with the patient in the After Visit Summary.    Diagnoses and all orders for this visit:    1. Medicare annual wellness visit, subsequent (Primary)  Counseled on healthy weight, nutrition, physical activity, cancer screening, and immunizations.    2. Influenza vaccine needed  -     Fluzone High-Dose 65+yrs (3509-3361)    3. Kidney mass  -     CBC & Differential; Future  -     Comprehensive Metabolic Panel; Future  -     Hemoglobin A1c; Future  -     Lipid Panel; Future  -     Urinalysis With Culture If Indicated - Urine, Clean Catch; Future  -     Vitamin D,25-Hydroxy; Future  -     CBC & Differential  -     Comprehensive Metabolic Panel  -     Hemoglobin A1c  -     Lipid Panel  -     Urinalysis With Culture If Indicated - Urine, Clean Catch  -     Vitamin D,25-Hydroxy    4. Benign prostatic hyperplasia with urinary frequency  -     CBC & Differential; Future  -     Comprehensive Metabolic Panel; Future  -     Hemoglobin A1c; Future  -     Lipid Panel; Future  -     Urinalysis With Culture If Indicated - Urine, Clean Catch; Future  -     Vitamin D,25-Hydroxy; Future  -     CBC & Differential  -      Comprehensive Metabolic Panel  -     Hemoglobin A1c  -     Lipid Panel  -     Urinalysis With Culture If Indicated - Urine, Clean Catch  -     Vitamin D,25-Hydroxy    5. Vitamin D deficiency  -     CBC & Differential; Future  -     Comprehensive Metabolic Panel; Future  -     Hemoglobin A1c; Future  -     Lipid Panel; Future  -     Urinalysis With Culture If Indicated - Urine, Clean Catch; Future  -     Vitamin D,25-Hydroxy; Future  -     CBC & Differential  -     Comprehensive Metabolic Panel  -     Hemoglobin A1c  -     Lipid Panel  -     Urinalysis With Culture If Indicated - Urine, Clean Catch  -     Vitamin D,25-Hydroxy    6. Screening for cardiovascular condition  -     CBC & Differential; Future  -     Comprehensive Metabolic Panel; Future  -     Hemoglobin A1c; Future  -     Lipid Panel; Future  -     Urinalysis With Culture If Indicated - Urine, Clean Catch; Future  -     Vitamin D,25-Hydroxy; Future  -     CBC & Differential  -     Comprehensive Metabolic Panel  -     Hemoglobin A1c  -     Lipid Panel  -     Urinalysis With Culture If Indicated - Urine, Clean Catch  -     Vitamin D,25-Hydroxy    7. Abnormal finding of blood chemistry, unspecified  -     Hemoglobin A1c; Future  -     Hemoglobin A1c    8. Colon cancer screening    9. Dyslipidemia  -     pravastatin (PRAVACHOL) 40 MG tablet; Take 1 tablet by mouth Every Night.  Dispense: 90 tablet; Refill: 3        Follow Up:   Next Medicare Wellness visit to be scheduled in 1 year.      An After Visit Summary and PPPS were made available to the patient.

## 2023-10-12 ENCOUNTER — PATIENT ROUNDING (BHMG ONLY) (OUTPATIENT)
Dept: FAMILY MEDICINE CLINIC | Facility: CLINIC | Age: 73
End: 2023-10-12
Payer: MEDICARE

## 2023-10-13 ENCOUNTER — HOSPITAL ENCOUNTER (OUTPATIENT)
Dept: MRI IMAGING | Facility: HOSPITAL | Age: 73
Discharge: HOME | End: 2023-10-13
Admitting: UROLOGY
Payer: MEDICARE

## 2023-10-13 DIAGNOSIS — N28.89 OTHER SPECIFIED DISORDERS OF KIDNEY AND URETER: ICD-10-CM

## 2023-10-13 PROCEDURE — 0 GADOBENATE DIMEGLUMINE 529 MG/ML SOLUTION: Performed by: UROLOGY

## 2023-10-13 PROCEDURE — A9577 INJ MULTIHANCE: HCPCS | Performed by: UROLOGY

## 2023-10-13 PROCEDURE — 74183 MRI ABD W/O CNTR FLWD CNTR: CPT

## 2023-10-13 RX ADMIN — GADOBENATE DIMEGLUMINE 13 ML: 529 INJECTION, SOLUTION INTRAVENOUS at 10:49

## 2023-10-17 ENCOUNTER — OFFICE VISIT (OUTPATIENT)
Dept: UROLOGY | Facility: CLINIC | Age: 73
End: 2023-10-17
Payer: MEDICARE

## 2023-10-17 VITALS
HEART RATE: 56 BPM | BODY MASS INDEX: 27.75 KG/M2 | DIASTOLIC BLOOD PRESSURE: 80 MMHG | OXYGEN SATURATION: 99 % | SYSTOLIC BLOOD PRESSURE: 164 MMHG | WEIGHT: 147 LBS | HEIGHT: 61 IN

## 2023-10-17 DIAGNOSIS — R35.0 BENIGN PROSTATIC HYPERPLASIA WITH URINARY FREQUENCY: Primary | ICD-10-CM

## 2023-10-17 DIAGNOSIS — N40.1 BENIGN PROSTATIC HYPERPLASIA WITH URINARY FREQUENCY: Primary | ICD-10-CM

## 2023-10-17 LAB
BILIRUB BLD-MCNC: NEGATIVE MG/DL
CLARITY, POC: CLEAR
COLOR UR: YELLOW
EXPIRATION DATE: NORMAL
GLUCOSE UR STRIP-MCNC: NEGATIVE MG/DL
KETONES UR QL: NEGATIVE
LEUKOCYTE EST, POC: NEGATIVE
Lab: NORMAL
NITRITE UR-MCNC: NEGATIVE MG/ML
PH UR: 6 [PH] (ref 5–8)
PROT UR STRIP-MCNC: NEGATIVE MG/DL
RBC # UR STRIP: NEGATIVE /UL
SP GR UR: 1.02 (ref 1–1.03)
UROBILINOGEN UR QL: NORMAL

## 2023-10-17 NOTE — PROGRESS NOTES
Office Visit New Urology      Patient Name: Elliot Jean Baptiste  : 1950   MRN: [id number]     Chief Complaint:    Chief Complaint   Patient presents with    kidney lesion       Referring Provider: System, Provider Not In    History of Present Illness: Elliot Jean Baptiste is a 73 y.o. male who presents to Urology today for evaluation of right renal lesions.    Patient reports this work-up was prompted by an episode of gross hematuria in 2023. He was found to have a UTI, hematuria resolved with antibiotics. He sought evaluation with [facility] urologist which prompted CTU. Patient reports this demonstrated indeterminate lesions on his right kidney, further prompting MRI abdomen which demonstrates proteinaceous vs hemorrhagic cysts of his right kidney. He presents today to establish care. He has not undergone cystoscopy. He denies any further episodes of gross hematuria.    He has been on flomax and finasteride for years. IPSS 11, QOL 1. He is interested in further symptom improvement.    Of note, he reports a history of elevated PSA. He reports 2 prior negative prostate biopsies and mpMRI in  (he brings with him the report, PI-RADS 1).     Subjective      Review of System:   Review of Systems   Constitutional: Negative.    HENT: Negative.     Eyes: Negative.    Respiratory: Negative.     Cardiovascular: Negative.    Gastrointestinal: Negative.    Endocrine: Negative.    Genitourinary:  Positive for flank pain, frequency, hematuria and urgency.   Skin: Negative.    Allergic/Immunologic: Negative.    Neurological: Negative.    Hematological: Negative.    Psychiatric/Behavioral: Negative.        I have reviewed the ROS documented by my clinical staff, I have updated appropriately and I agree. Cheri Dubon MD    Past Medical History:   Past Medical History:   Diagnosis Date    Arthritis     Cancer     skin    Colon polyp     Depression     Enlarged prostate     Gallstones     History of medical problems      poor blood clotting     Hypertension     Kidney infection     Kidney stone     Migraine        Past Surgical History:   Past Surgical History:   Procedure Laterality Date    APPENDECTOMY      CATARACT EXTRACTION Bilateral     GALLBLADDER SURGERY      HEMORRHOIDECTOMY      KIDNEY STONE SURGERY      KNEE CARTILAGE SURGERY Left     ULNAR NERVE REPAIR Left        Family History:   Family History   Problem Relation Age of Onset    Cancer Mother     Hypertension Mother     Migraines Mother     Osteoporosis Mother     Hypertension Father     Hyperlipidemia Father     Migraines Sister        Social History:   Social History     Socioeconomic History    Marital status:    Tobacco Use    Smoking status: Never     Passive exposure: Past    Smokeless tobacco: Never   Vaping Use    Vaping Use: Never used   Substance and Sexual Activity    Alcohol use: Yes     Comment: SOCAIL     Drug use: No    Sexual activity: Never       Medications:     Current Outpatient Medications:     alfuzosin (UROXATRAL) 10 MG 24 hr tablet, Take 1 tablet by mouth Every Night., Disp: , Rfl:     Cholecalciferol (VITAMIN D3) 5000 units capsule capsule, Take 1 capsule by mouth Daily., Disp: , Rfl:     finasteride (PROSCAR) 5 MG tablet, Take 1 tablet by mouth Daily., Disp: , Rfl:     pravastatin (PRAVACHOL) 40 MG tablet, Take 1 tablet by mouth Every Night., Disp: 90 tablet, Rfl: 3    tamsulosin (FLOMAX) 0.4 MG capsule 24 hr capsule, Take 1 capsule by mouth Daily., Disp: , Rfl:     cephalexin (KEFLEX) 500 MG capsule, Take 1 capsule by mouth 2 (Two) Times a Day. (Patient not taking: Reported on 10/17/2023), Disp: , Rfl:     Allergies:   No Known Allergies    IPSS Questionnaire (AUA-7):  Over the past month…    1)  Incomplete Emptying  How often have you had a sensation of not emptying your bladder?  1 - Less than 1 time in 5   2)  Frequency  How often have you had to urinate less than every two hours? 0 - Not at all   3)  Intermittency  How often have  "you found you stopped and started again several times when you urinated?  3 - About half the time   4) Urgency  How often have you found it difficult to postpone urination?  2 - Less than half the time   5) Weak Stream  How often have you had a weak urinary stream?  2 - Less than half the time   6) Straining  How often have you had to push or strain to begin urination?  0 - Not at all   7) Nocturia  How many times did you typically get up at night to urinate?  3 - 3 times   Total Score:  11   The International Prostate Symptom Score (IPSS) is used to screen, diagnose, track symptoms of benign prostatic hyperplasia (BPH).    0-7 pts (Mild Symptoms)  / 8-19 pts (Moderate) / 20-35 (Severe)    Quality of life due to urinary symptoms:  If you were to spend the rest of your life with your urinary condition the way it is now, how would you feel about that? 1-Pleased   Urine Leakage (Incontinence) 0-No Leakage       Objective     Physical Exam:   Vital Signs:   Vitals:    10/17/23 1509   BP: 164/80   Pulse: 56   SpO2: 99%   Weight: 66.7 kg (147 lb)   Height: 154.9 cm (60.98\")   PainSc: 0-No pain     Body mass index is 27.79 kg/m².       Constitutional: Awake, alert    Eyes: PERRLA, sclerae anicteric, no conjunctival injection   HENT: Normocephalic, atraumatic, mucous membranes moist   Neck: trachea midline   Respiratory: Equal chest rise, non-labored respirations    Cardiovascular: well-perfused   Gastrointestinal: non-distended   Musculoskeletal: No bilateral ankle edema, no clubbing or cyanosis to extremities   Psychiatric: Appropriate affect, cooperative   Neurologic: Oriented x 3, Cranial Nerves grossly intact, speech clear   Skin: No rashes       Labs:   Brief Urine Lab Results  (Last result in the past 365 days)        Color   Clarity   Blood   Leuk Est   Nitrite   Protein   CREAT   Urine HCG        10/17/23 1547 Yellow   Clear   Negative   Negative   Negative   Negative                        Lab Results   Component " Value Date    GLUCOSE 125 (H) 10/11/2023    CALCIUM 9.7 10/11/2023     10/11/2023    K 4.8 10/11/2023    CO2 25.6 10/11/2023     10/11/2023    BUN 21 10/11/2023    CREATININE 1.48 (H) 10/11/2023    EGFRIFNONA 46 (L) 04/09/2019    BCR 14.2 10/11/2023    ANIONGAP 8.4 10/11/2023       Lab Results   Component Value Date    WBC 3.62 10/11/2023    HGB 13.8 10/11/2023    HCT 39.1 10/11/2023    MCV 89.1 10/11/2023     10/11/2023       Lab Results   Component Value Date    PSA 4.250 (H) 04/09/2019        Images:   MRI Abdomen With & Without Contrast    Result Date: 10/13/2023  Impression: Benign hepatic cyst and hemangioma. No suspicious liver lesions. Benign renal cysts. No suspicious renal lesions. Electronically Signed: Luciano Arellano MD  10/13/2023 11:23 AM EDT  Workstation ID: [id number]      Measures:   Tobacco:   Elliot Jean Baptiste  reports that he has never smoked. He has been exposed to tobacco smoke. He has never used smokeless tobacco.         Urine Incontinence: Patient reports that he is not currently experiencing any symptoms of urinary incontinence.     Assessment / Plan      Assessment/Plan:   Elliot Jean Baptiste is a 73 y.o. male who presented today for evaluation of right renal lesions, gross hematuria, and LUTS. Regarding his renal lesions, his MR demonstrates protenaceous vs hemorrhagic cysts. Regarding his gross hematuria, he has not completed the full gross hematuria work-up with cystoscopy.       - repeat PSA   - RTC in 2-4 weeks for cystoscopy    Diagnoses and all orders for this visit:    1. Benign prostatic hyperplasia with urinary frequency (Primary)  -     POC Urinalysis Dipstick, Automated        Patient Education:   Patient educated on the possible etiologies of gross hematuria, including malignancy, enlarged prostate, and infection. We discussed the indication to complete a gross hematuria workup with cytoscopy, given his age and history. Regarding his LUTS, we will plan to perform  TRUS and Uroflow to complete the anatomy evaluation. We discussed the role of repeat PSA prior to consideration of an outlet procedure, given his history of elevated PSA.     Follow Up:   Return in about 2 weeks (around 10/31/2023) for Procedure next visit.    I spent approximately 30 minutes providing clinical care for this patient; including review of patient's chart and provider documentation, face to face time spent with patient in examination room (obtaining history, performing physical exam, discussing diagnosis and management options), placing orders, and completing patient documentation.     Cheri Dubon MD  [facility]

## 2023-11-08 ENCOUNTER — PROCEDURE VISIT (OUTPATIENT)
Dept: UROLOGY | Facility: CLINIC | Age: 73
End: 2023-11-08
Payer: MEDICARE

## 2023-11-08 VITALS
BODY MASS INDEX: 27.75 KG/M2 | WEIGHT: 147 LBS | OXYGEN SATURATION: 98 % | DIASTOLIC BLOOD PRESSURE: 88 MMHG | HEIGHT: 61 IN | SYSTOLIC BLOOD PRESSURE: 170 MMHG | HEART RATE: 55 BPM

## 2023-11-08 DIAGNOSIS — R31.9 HEMATURIA, UNSPECIFIED TYPE: ICD-10-CM

## 2023-11-08 DIAGNOSIS — R35.0 BENIGN PROSTATIC HYPERPLASIA WITH URINARY FREQUENCY: Primary | ICD-10-CM

## 2023-11-08 DIAGNOSIS — N40.1 BENIGN PROSTATIC HYPERPLASIA WITH URINARY FREQUENCY: Primary | ICD-10-CM

## 2023-11-08 NOTE — PROGRESS NOTES
Follow Up Office Visit      Patient Name: Elliot Jean Baptiste  : 1950   MRN: [id number]     Chief Complaint:    Chief Complaint   Patient presents with    Benign prostatic hyperplasia with urinary frequency       Referring Provider: No ref. provider found    History of Present Illness: Elliot Jean Baptiste is a 73 y.o. male who presents today for follow up of  his LUTS.  He has had no change since his last visit.  He reports he is still getting up several times/night.  He complains of weak stream and intermittency.      Preprocedure diagnosis  LUTS    Postprocedure diagnosis  Same    Procedure  Flexible Cystourethroscopy    Attending surgeon  Cheri Dubon MD    Anesthesia  2% lidocaine jelly intraurethrally    Complications  None    Indications  73 y.o. male undergoing a flexible cystoscopy for the above mentioned indications.  Informed consent was obtained.      Findings  Cystoscopic findings included one right and left ureteral orifice in the normal orthotopic position with normal bladder mucosa and no tumors, masses or stones.   The urethral urothelium was within normal limits with no visible strictures.  The prostatic urethra revealed complete lateral lobe coaptation, with large median lobe.  Circumferential protrusion of his prostate.  Moderate trabeculation.     Procedure  The patient was placed in supine position and prepped and draped in sterile fashion with lidocaine jelly instill per the urethra for anesthesia 5 minutes prior to procedure start.  A brief timeout was performed including available nursing staff and the patient.  The 16 Fr digital flexible cystoscope was lubricated and gently advanced into the urethral meatus. The penile, bulbar, prostatic, and membranous urethra appeared widely patent without obvious stricture or mucosal abnormality. The scope was then advanced into the bladder. The bladder was completely visualized starting with the trigone. There were bilateral orthotopic ureteral  orifices. The posterior wall, lateral walls, anterior wall, and dome were completely visualized WITHOUT obvious mucosal lesions, tumors, stones.  Moderate trabeculations.  The cystoscope was retroflexed and the bladder neck and prostate were further visualized and appeared normal.  The cystoscope was then gently withdrawn while visualizing the urethra and the procedure was then terminated.  The patient tolerated the procedure well.      TRUS Volume obtained.    ESTRELLA: Normal     Total Volume: 38.89 cc  Height 33.69 mm  Width 48.87 mm Length 45.17 mm    Uroflow     Avg flow: 3.7  ml/sec  Max flow: 7.9 ml/sec  Time to max flow: 8.8 sec  Voided volume: 214 mL     PVR: 8 mL          Subjective      Review of System:   Review of Systems   Constitutional:  Negative for chills, fatigue, fever and unexpected weight change.   HENT:  Negative for congestion, rhinorrhea and sore throat.    Eyes:  Negative for visual disturbance.   Respiratory:  Negative for apnea, cough, chest tightness and shortness of breath.    Cardiovascular:  Negative for chest pain.   Gastrointestinal:  Negative for abdominal pain, constipation, diarrhea, nausea and vomiting.   Endocrine: Negative for polydipsia and polyuria.   Genitourinary:  Negative for difficulty urinating, dysuria, enuresis, flank pain, frequency, genital sores, hematuria and urgency.   Musculoskeletal:  Negative for gait problem.   Skin:  Negative for rash and wound.   Allergic/Immunologic: Negative for immunocompromised state.   Neurological:  Negative for dizziness, tremors, syncope, weakness and light-headedness.   Hematological:  Does not bruise/bleed easily.      I have reviewed the ROS documented by my clinical staff, I have updated appropriately and I agree. Cheri Dubon MD    I have reviewed and the following portions of the patient's history were updated as appropriate: past family history, past medical history, past social history, past surgical history and problem  "list.    Past Medical History:   Past Medical History:   Diagnosis Date    Arthritis     Cancer     skin    Colon polyp     Depression     Enlarged prostate     Gallstones     History of medical problems     poor blood clotting     Hypertension     Kidney infection     Kidney stone     Migraine        Past Surgical History:  Past Surgical History:   Procedure Laterality Date    APPENDECTOMY      CATARACT EXTRACTION Bilateral     GALLBLADDER SURGERY      HEMORRHOIDECTOMY      KIDNEY STONE SURGERY      KNEE CARTILAGE SURGERY Left     ULNAR NERVE REPAIR Left        Family History:   family history includes Cancer in his mother; Hyperlipidemia in his father; Hypertension in his father and mother; Migraines in his mother and sister; Osteoporosis in his mother.   Otherwise pertinent FHx was reviewed and not pertinent to current issue.    Social History:    reports that he has never smoked. He has been exposed to tobacco smoke. He has never used smokeless tobacco. He reports current alcohol use. He reports that he does not use drugs.    Medications:     Current Outpatient Medications:     alfuzosin (UROXATRAL) 10 MG 24 hr tablet, Take 1 tablet by mouth Every Night., Disp: , Rfl:     Cholecalciferol (VITAMIN D3) 5000 units capsule capsule, Take 1 capsule by mouth Daily., Disp: , Rfl:     finasteride (PROSCAR) 5 MG tablet, Take 1 tablet by mouth Daily., Disp: , Rfl:     pravastatin (PRAVACHOL) 40 MG tablet, Take 1 tablet by mouth Every Night., Disp: 90 tablet, Rfl: 3    tamsulosin (FLOMAX) 0.4 MG capsule 24 hr capsule, Take 1 capsule by mouth Daily., Disp: , Rfl:     Allergies:   No Known Allergies        Objective     Physical Exam:   Vital Signs:   Vitals:    11/08/23 1058   BP: 170/88   Pulse: 55   SpO2: 98%   Weight: 66.7 kg (147 lb)   Height: 154.9 cm (60.98\")   PainSc: 0-No pain     Body mass index is 27.79 kg/m².     Physical Exam  Vitals and nursing note reviewed.   Constitutional:       General: He is awake. He is " not in acute distress.     Appearance: Normal appearance. He is well-developed.   HENT:      Head: Normocephalic and atraumatic.      Right Ear: External ear normal.      Left Ear: External ear normal.      Nose: Nose normal.   Eyes:      Conjunctiva/sclera: Conjunctivae normal.   Pulmonary:      Effort: Pulmonary effort is normal.   Abdominal:      General: There is no distension.      Palpations: Abdomen is soft. There is no mass.      Tenderness: There is no abdominal tenderness. There is no right CVA tenderness, left CVA tenderness, guarding or rebound.      Hernia: No hernia is present. There is no hernia in the left inguinal area or right inguinal area.   Genitourinary:     Pubic Area: No rash.       Penis: Normal.       Testes: Normal.      Prostate: Normal.      Rectum: Normal. No mass or tenderness. Normal anal tone.   Musculoskeletal:      Cervical back: Normal range of motion.   Lymphadenopathy:      Lower Body: No right inguinal adenopathy. No left inguinal adenopathy.   Skin:     General: Skin is warm.   Neurological:      General: No focal deficit present.      Mental Status: He is alert and oriented to person, place, and time.   Psychiatric:         Behavior: Behavior normal. Behavior is cooperative.         Labs:   Brief Urine Lab Results  (Last result in the past 365 days)        Color   Clarity   Blood   Leuk Est   Nitrite   Protein   CREAT   Urine HCG        11/08/23 1102 Yellow   Clear   Negative   Negative   Negative   Negative                        Lab Results   Component Value Date    GLUCOSE 125 (H) 10/11/2023    CALCIUM 9.7 10/11/2023     10/11/2023    K 4.8 10/11/2023    CO2 25.6 10/11/2023     10/11/2023    BUN 21 10/11/2023    CREATININE 1.48 (H) 10/11/2023    EGFRIFNONA 46 (L) 04/09/2019    BCR 14.2 10/11/2023    ANIONGAP 8.4 10/11/2023       Lab Results   Component Value Date    WBC 3.62 10/11/2023    HGB 13.8 10/11/2023    HCT 39.1 10/11/2023    MCV 89.1 10/11/2023    PLT  165 10/11/2023       Lab Results   Component Value Date    PSA 4.250 (H) 04/09/2019       Images:   MRI Abdomen With & Without Contrast    Result Date: 10/13/2023  Impression: Benign hepatic cyst and hemangioma. No suspicious liver lesions. Benign renal cysts. No suspicious renal lesions. Electronically Signed: Luciano Arellano MD  10/13/2023 11:23 AM EDT  Workstation ID: [id number]      Measures:   Tobacco:   Elliot Jean Baptiste  reports that he has never smoked. He has been exposed to tobacco smoke. He has never used smokeless tobacco.. I    Urine Incontinence: Patient reports that he is not currently experiencing any symptoms of urinary incontinence.         Assessment / Plan      Assessment/Plan:   73 y.o. male who presented today for follow up of his obstructive lower urinary tract symptoms.  Based on his findings he would not be an ideal candidate for urolift.  I would recommend either TURP or Greenlight laser PVP.  After hearing the specific risks and benefits to each procedure he has elected to move forward with Greenlight PVP.   He has had multiple prostate biopsies and MRI in the past which were negative.    Diagnoses and all orders for this visit:    1. Benign prostatic hyperplasia with urinary frequency (Primary)  -     US Prostate  -     Uroflowmetry; Future  -     POC Urinalysis Dipstick, Automated  -     Case Request; Standing  -     ceFAZolin (ANCEF) 2,000 mg in sodium chloride 0.9 % 100 mL IVPB  -     CBC (No Diff); Future  -     Basic Metabolic Panel; Future  -     Protime-INR; Future  -     Urinalysis With Culture If Indicated -; Future  -     Case Request    2. Hematuria, unspecified type  -     Protime-INR; Future    Other orders  -     Outpatient In A Bed; Standing  -     Follow Anesthesia Guidelines / Protocol; Future  -     Follow Anesthesia Guidelines / Protocol; Standing  -     Verify NPO Status; Standing  -     Obtain Informed Consent; Standing  -     SCD (Sequential Compression Device) - Place on  Patient in Pre-Op; Standing  -     Verify / Perform Chlorhexidine Skin Prep; Standing  -     Verify / Perform Chlorhexidine Skin Prep if Indicated (If Not Already Completed); Standing  -     Provide NPO Instructions to Patient; Future  -     Chlorhexidine Skin Prep; Future         Follow Up:   Return for Follow up after surgery.    I spent approximately 30 minutes providing clinical care for this patient; including review of patient's chart and provider documentation, face to face time spent with patient in examination room (obtaining history, performing physical exam, discussing diagnosis and management options), placing orders, and completing patient documentation.     Cheri Dubon MD  [facility] [place]

## 2023-11-08 NOTE — H&P (VIEW-ONLY)
Follow Up Office Visit      Patient Name: Elliot Jean Baptiste  : 1950   MRN: [id number]     Chief Complaint:    Chief Complaint   Patient presents with    Benign prostatic hyperplasia with urinary frequency       Referring Provider: No ref. provider found    History of Present Illness: Elliot Jean Baptiste is a 73 y.o. male who presents today for follow up of  his LUTS.  He has had no change since his last visit.  He reports he is still getting up several times/night.  He complains of weak stream and intermittency.      Preprocedure diagnosis  LUTS    Postprocedure diagnosis  Same    Procedure  Flexible Cystourethroscopy    Attending surgeon  Cheri Dubon MD    Anesthesia  2% lidocaine jelly intraurethrally    Complications  None    Indications  73 y.o. male undergoing a flexible cystoscopy for the above mentioned indications.  Informed consent was obtained.      Findings  Cystoscopic findings included one right and left ureteral orifice in the normal orthotopic position with normal bladder mucosa and no tumors, masses or stones.   The urethral urothelium was within normal limits with no visible strictures.  The prostatic urethra revealed complete lateral lobe coaptation, with large median lobe.  Circumferential protrusion of his prostate.  Moderate trabeculation.     Procedure  The patient was placed in supine position and prepped and draped in sterile fashion with lidocaine jelly instill per the urethra for anesthesia 5 minutes prior to procedure start.  A brief timeout was performed including available nursing staff and the patient.  The 16 Fr digital flexible cystoscope was lubricated and gently advanced into the urethral meatus. The penile, bulbar, prostatic, and membranous urethra appeared widely patent without obvious stricture or mucosal abnormality. The scope was then advanced into the bladder. The bladder was completely visualized starting with the trigone. There were bilateral orthotopic ureteral  orifices. The posterior wall, lateral walls, anterior wall, and dome were completely visualized WITHOUT obvious mucosal lesions, tumors, stones.  Moderate trabeculations.  The cystoscope was retroflexed and the bladder neck and prostate were further visualized and appeared normal.  The cystoscope was then gently withdrawn while visualizing the urethra and the procedure was then terminated.  The patient tolerated the procedure well.      TRUS Volume obtained.    ESTRELLA: Normal     Total Volume: 38.89 cc  Height 33.69 mm  Width 48.87 mm Length 45.17 mm    Uroflow     Avg flow: 3.7  ml/sec  Max flow: 7.9 ml/sec  Time to max flow: 8.8 sec  Voided volume: 214 mL     PVR: 8 mL          Subjective      Review of System:   Review of Systems   Constitutional:  Negative for chills, fatigue, fever and unexpected weight change.   HENT:  Negative for congestion, rhinorrhea and sore throat.    Eyes:  Negative for visual disturbance.   Respiratory:  Negative for apnea, cough, chest tightness and shortness of breath.    Cardiovascular:  Negative for chest pain.   Gastrointestinal:  Negative for abdominal pain, constipation, diarrhea, nausea and vomiting.   Endocrine: Negative for polydipsia and polyuria.   Genitourinary:  Negative for difficulty urinating, dysuria, enuresis, flank pain, frequency, genital sores, hematuria and urgency.   Musculoskeletal:  Negative for gait problem.   Skin:  Negative for rash and wound.   Allergic/Immunologic: Negative for immunocompromised state.   Neurological:  Negative for dizziness, tremors, syncope, weakness and light-headedness.   Hematological:  Does not bruise/bleed easily.      I have reviewed the ROS documented by my clinical staff, I have updated appropriately and I agree. Cheri Dubon MD    I have reviewed and the following portions of the patient's history were updated as appropriate: past family history, past medical history, past social history, past surgical history and problem  "list.    Past Medical History:   Past Medical History:   Diagnosis Date    Arthritis     Cancer     skin    Colon polyp     Depression     Enlarged prostate     Gallstones     History of medical problems     poor blood clotting     Hypertension     Kidney infection     Kidney stone     Migraine        Past Surgical History:  Past Surgical History:   Procedure Laterality Date    APPENDECTOMY      CATARACT EXTRACTION Bilateral     GALLBLADDER SURGERY      HEMORRHOIDECTOMY      KIDNEY STONE SURGERY      KNEE CARTILAGE SURGERY Left     ULNAR NERVE REPAIR Left        Family History:   family history includes Cancer in his mother; Hyperlipidemia in his father; Hypertension in his father and mother; Migraines in his mother and sister; Osteoporosis in his mother.   Otherwise pertinent FHx was reviewed and not pertinent to current issue.    Social History:    reports that he has never smoked. He has been exposed to tobacco smoke. He has never used smokeless tobacco. He reports current alcohol use. He reports that he does not use drugs.    Medications:     Current Outpatient Medications:     alfuzosin (UROXATRAL) 10 MG 24 hr tablet, Take 1 tablet by mouth Every Night., Disp: , Rfl:     Cholecalciferol (VITAMIN D3) 5000 units capsule capsule, Take 1 capsule by mouth Daily., Disp: , Rfl:     finasteride (PROSCAR) 5 MG tablet, Take 1 tablet by mouth Daily., Disp: , Rfl:     pravastatin (PRAVACHOL) 40 MG tablet, Take 1 tablet by mouth Every Night., Disp: 90 tablet, Rfl: 3    tamsulosin (FLOMAX) 0.4 MG capsule 24 hr capsule, Take 1 capsule by mouth Daily., Disp: , Rfl:     Allergies:   No Known Allergies        Objective     Physical Exam:   Vital Signs:   Vitals:    11/08/23 1058   BP: 170/88   Pulse: 55   SpO2: 98%   Weight: 66.7 kg (147 lb)   Height: 154.9 cm (60.98\")   PainSc: 0-No pain     Body mass index is 27.79 kg/m².     Physical Exam  Vitals and nursing note reviewed.   Constitutional:       General: He is awake. He is " not in acute distress.     Appearance: Normal appearance. He is well-developed.   HENT:      Head: Normocephalic and atraumatic.      Right Ear: External ear normal.      Left Ear: External ear normal.      Nose: Nose normal.   Eyes:      Conjunctiva/sclera: Conjunctivae normal.   Pulmonary:      Effort: Pulmonary effort is normal.   Abdominal:      General: There is no distension.      Palpations: Abdomen is soft. There is no mass.      Tenderness: There is no abdominal tenderness. There is no right CVA tenderness, left CVA tenderness, guarding or rebound.      Hernia: No hernia is present. There is no hernia in the left inguinal area or right inguinal area.   Genitourinary:     Pubic Area: No rash.       Penis: Normal.       Testes: Normal.      Prostate: Normal.      Rectum: Normal. No mass or tenderness. Normal anal tone.   Musculoskeletal:      Cervical back: Normal range of motion.   Lymphadenopathy:      Lower Body: No right inguinal adenopathy. No left inguinal adenopathy.   Skin:     General: Skin is warm.   Neurological:      General: No focal deficit present.      Mental Status: He is alert and oriented to person, place, and time.   Psychiatric:         Behavior: Behavior normal. Behavior is cooperative.         Labs:   Brief Urine Lab Results  (Last result in the past 365 days)        Color   Clarity   Blood   Leuk Est   Nitrite   Protein   CREAT   Urine HCG        11/08/23 1102 Yellow   Clear   Negative   Negative   Negative   Negative                        Lab Results   Component Value Date    GLUCOSE 125 (H) 10/11/2023    CALCIUM 9.7 10/11/2023     10/11/2023    K 4.8 10/11/2023    CO2 25.6 10/11/2023     10/11/2023    BUN 21 10/11/2023    CREATININE 1.48 (H) 10/11/2023    EGFRIFNONA 46 (L) 04/09/2019    BCR 14.2 10/11/2023    ANIONGAP 8.4 10/11/2023       Lab Results   Component Value Date    WBC 3.62 10/11/2023    HGB 13.8 10/11/2023    HCT 39.1 10/11/2023    MCV 89.1 10/11/2023    PLT  165 10/11/2023       Lab Results   Component Value Date    PSA 4.250 (H) 04/09/2019       Images:   MRI Abdomen With & Without Contrast    Result Date: 10/13/2023  Impression: Benign hepatic cyst and hemangioma. No suspicious liver lesions. Benign renal cysts. No suspicious renal lesions. Electronically Signed: Luciano Arellano MD  10/13/2023 11:23 AM EDT  Workstation ID: [id number]      Measures:   Tobacco:   Elliot Jean Baptiste  reports that he has never smoked. He has been exposed to tobacco smoke. He has never used smokeless tobacco.. I    Urine Incontinence: Patient reports that he is not currently experiencing any symptoms of urinary incontinence.         Assessment / Plan      Assessment/Plan:   73 y.o. male who presented today for follow up of his obstructive lower urinary tract symptoms.  Based on his findings he would not be an ideal candidate for urolift.  I would recommend either TURP or Greenlight laser PVP.  After hearing the specific risks and benefits to each procedure he has elected to move forward with Greenlight PVP.   He has had multiple prostate biopsies and MRI in the past which were negative.    Diagnoses and all orders for this visit:    1. Benign prostatic hyperplasia with urinary frequency (Primary)  -     US Prostate  -     Uroflowmetry; Future  -     POC Urinalysis Dipstick, Automated  -     Case Request; Standing  -     ceFAZolin (ANCEF) 2,000 mg in sodium chloride 0.9 % 100 mL IVPB  -     CBC (No Diff); Future  -     Basic Metabolic Panel; Future  -     Protime-INR; Future  -     Urinalysis With Culture If Indicated -; Future  -     Case Request    2. Hematuria, unspecified type  -     Protime-INR; Future    Other orders  -     Outpatient In A Bed; Standing  -     Follow Anesthesia Guidelines / Protocol; Future  -     Follow Anesthesia Guidelines / Protocol; Standing  -     Verify NPO Status; Standing  -     Obtain Informed Consent; Standing  -     SCD (Sequential Compression Device) - Place on  Patient in Pre-Op; Standing  -     Verify / Perform Chlorhexidine Skin Prep; Standing  -     Verify / Perform Chlorhexidine Skin Prep if Indicated (If Not Already Completed); Standing  -     Provide NPO Instructions to Patient; Future  -     Chlorhexidine Skin Prep; Future         Follow Up:   Return for Follow up after surgery.    I spent approximately 30 minutes providing clinical care for this patient; including review of patient's chart and provider documentation, face to face time spent with patient in examination room (obtaining history, performing physical exam, discussing diagnosis and management options), placing orders, and completing patient documentation.     Cheri Dubon MD  [facility]

## 2023-11-21 DIAGNOSIS — U07.1 COVID-19 VIRUS INFECTION: Primary | ICD-10-CM

## 2023-11-21 RX ORDER — NIRMATRELVIR AND RITONAVIR 150-100 MG
2 KIT ORAL 2 TIMES DAILY
Qty: 20 TABLET | Refills: 0 | Status: SHIPPED | OUTPATIENT
Start: 2023-11-21 | End: 2023-11-26

## 2023-11-29 ENCOUNTER — OFFICE VISIT (OUTPATIENT)
Dept: FAMILY MEDICINE CLINIC | Facility: CLINIC | Age: 73
End: 2023-11-29
Payer: MEDICARE

## 2023-11-29 ENCOUNTER — TELEPHONE (OUTPATIENT)
Dept: FAMILY MEDICINE CLINIC | Facility: CLINIC | Age: 73
End: 2023-11-29

## 2023-11-29 VITALS
OXYGEN SATURATION: 97 % | HEIGHT: 61 IN | DIASTOLIC BLOOD PRESSURE: 86 MMHG | HEART RATE: 84 BPM | WEIGHT: 147.6 LBS | SYSTOLIC BLOOD PRESSURE: 140 MMHG | BODY MASS INDEX: 27.87 KG/M2

## 2023-11-29 DIAGNOSIS — R03.0 ELEVATED BLOOD PRESSURE READING: ICD-10-CM

## 2023-11-29 DIAGNOSIS — J32.4 PANSINUSITIS, UNSPECIFIED CHRONICITY: Primary | ICD-10-CM

## 2023-11-29 PROCEDURE — 1160F RVW MEDS BY RX/DR IN RCRD: CPT | Performed by: INTERNAL MEDICINE

## 2023-11-29 PROCEDURE — 1159F MED LIST DOCD IN RCRD: CPT | Performed by: INTERNAL MEDICINE

## 2023-11-29 PROCEDURE — 99214 OFFICE O/P EST MOD 30 MIN: CPT | Performed by: INTERNAL MEDICINE

## 2023-11-29 RX ORDER — AMOXICILLIN 875 MG/1
875 TABLET, COATED ORAL 2 TIMES DAILY
Qty: 14 TABLET | Refills: 0 | Status: SHIPPED | OUTPATIENT
Start: 2023-11-29 | End: 2023-12-06

## 2023-11-29 NOTE — TELEPHONE ENCOUNTER
Caller: Elliot Jean Baptiste    Relationship: Self    Best call back number: [phone]    What medication are you requesting: AMOXICILLIN    What are your current symptoms: SINUS CONGESTION WITH YELLOW DRAINAGE POST COVID    If a prescription is needed, what is your preferred pharmacy and phone number:    [facility] [phone]  Additional notes:    PATIENT JUST GOT OVER COVID AND NOW HAS A SINUS INFECTION. PLEASE ADVISE

## 2023-11-29 NOTE — PROGRESS NOTES
Chief Complaint   Patient presents with    Sinus Problem     Pt had covid 2 weeks ago, tested negative but now thinks he has a sinus infection.        HPI:  Elliot Jean Baptiste is a 73 y.o. male who presents today for sinus congestion, sore throat and cough. Diagnosed with COVID a few weeks ago and subsequently recovered but developed sinus congestion a few days later.      ROS:  Constitutional: no fevers, night sweats or unexplained weight loss  Eyes: no vision changes  ENT: no runny nose, ear pain, sore throat  Cardio: no chest pain, palpitations  Pulm: no shortness of breath, wheezing, or cough  GI: no abdominal pain or changes in bowel movements  : no difficulty urinating  MSK: no difficulty ambulating, no joint pain  Neuro: no weakness, dizziness or headache  Psych: no trouble sleeping  Endo: no change in appetite      Past Medical History:   Diagnosis Date    Arthritis     Cancer     skin    Colon polyp     Depression     Enlarged prostate     Gallstones     History of medical problems     poor blood clotting     Hypertension     Kidney infection     Kidney stone     Migraine       Family History   Problem Relation Age of Onset    Cancer Mother     Hypertension Mother     Migraines Mother     Osteoporosis Mother     Hypertension Father     Hyperlipidemia Father     Migraines Sister       Social History     Socioeconomic History    Marital status:    Tobacco Use    Smoking status: Never     Passive exposure: Past    Smokeless tobacco: Never   Vaping Use    Vaping Use: Never used   Substance and Sexual Activity    Alcohol use: Yes     Comment: SOCAIL     Drug use: No    Sexual activity: Never      No Known Allergies   Immunization History   Administered Date(s) Administered    COVID-19 (PFIZER) BIVALENT 12+YRS 09/23/2022    Fluzone (or Fluarix & Flulaval for VFC) >6mos 10/05/2018    Fluzone High-Dose 65+yrs 10/11/2023    Hepatitis A 11/03/2018, 05/20/2019    Influenza Quad Vaccine (Inpatient) 11/19/2010     Pneumococcal Conjugate 13-Valent (PCV13) 11/04/2014    Zostavax 05/01/2014        PE:  Vitals:    11/29/23 1041   BP: 140/86   Pulse: 84   SpO2: 97%      Body mass index is 27.9 kg/m².    Gen Appearance: NAD  HEENT: Normocephalic, PERRLA, no thyromegaly, trache midline  Heart: RRR, normal S1 and S2, no murmur  Lungs: CTA b/l, no wheezing, no crackles  Abdomen: Soft, non-tender, non-distended, no guarding and BSx4  MSK: Moves all extremities well, normal gait, no peripheral edema  Pulses: Palpable and equal b/l  Lymph nodes: No palpable lymphadenopathy   Neuro: No focal deficits      Current Outpatient Medications   Medication Sig Dispense Refill    alfuzosin (UROXATRAL) 10 MG 24 hr tablet Take 1 tablet by mouth Every Night.      Cholecalciferol (VITAMIN D3) 5000 units capsule capsule Take 1 capsule by mouth Daily.      finasteride (PROSCAR) 5 MG tablet Take 1 tablet by mouth Daily.      pravastatin (PRAVACHOL) 40 MG tablet Take 1 tablet by mouth Every Night. 90 tablet 3    tamsulosin (FLOMAX) 0.4 MG capsule 24 hr capsule Take 1 capsule by mouth Daily.      amoxicillin (AMOXIL) 875 MG tablet Take 1 tablet by mouth 2 (Two) Times a Day for 7 days. 14 tablet 0     No current facility-administered medications for this visit.      Starting on antibiotics, symptoms ongoing for the past 2 to 3 weeks.  Discussed elevated blood pressure reading with patient.  He plans on monitoring at home.  Reports history of whitecoat syndrome.    Diagnoses and all orders for this visit:    1. Pansinusitis, unspecified chronicity (Primary)  -     amoxicillin (AMOXIL) 875 MG tablet; Take 1 tablet by mouth 2 (Two) Times a Day for 7 days.  Dispense: 14 tablet; Refill: 0    2. Elevated blood pressure reading         No follow-ups on file.     Dictated Utilizing Dragon Dictation    Please note that portions of this note were completed with a voice recognition program.    Part of this note may be an electronic transcription/translation of spoken  language to printed text using the Dragon Dictation System.

## 2023-12-04 ENCOUNTER — PRE-ADMISSION TESTING (OUTPATIENT)
Dept: PREADMISSION TESTING | Facility: HOSPITAL | Age: 73
End: 2023-12-04
Payer: MEDICARE

## 2023-12-04 VITALS — WEIGHT: 145.94 LBS | BODY MASS INDEX: 27.55 KG/M2 | HEIGHT: 61 IN

## 2023-12-04 DIAGNOSIS — R35.0 BENIGN PROSTATIC HYPERPLASIA WITH URINARY FREQUENCY: ICD-10-CM

## 2023-12-04 DIAGNOSIS — R31.9 HEMATURIA, UNSPECIFIED TYPE: ICD-10-CM

## 2023-12-04 DIAGNOSIS — N40.1 BENIGN PROSTATIC HYPERPLASIA WITH URINARY FREQUENCY: ICD-10-CM

## 2023-12-04 LAB
ANION GAP SERPL CALCULATED.3IONS-SCNC: 10 MMOL/L (ref 5–15)
BUN SERPL-MCNC: 28 MG/DL (ref 8–23)
BUN/CREAT SERPL: 18.3 (ref 7–25)
CALCIUM SPEC-SCNC: 9.8 MG/DL (ref 8.6–10.5)
CHLORIDE SERPL-SCNC: 104 MMOL/L (ref 98–107)
CO2 SERPL-SCNC: 27 MMOL/L (ref 22–29)
CREAT SERPL-MCNC: 1.53 MG/DL (ref 0.76–1.27)
DEPRECATED RDW RBC AUTO: 38.1 FL (ref 37–54)
EGFRCR SERPLBLD CKD-EPI 2021: 47.1 ML/MIN/1.73
ERYTHROCYTE [DISTWIDTH] IN BLOOD BY AUTOMATED COUNT: 11.8 % (ref 12.3–15.4)
GLUCOSE SERPL-MCNC: 125 MG/DL (ref 65–99)
HBA1C MFR BLD: 6 % (ref 4.8–5.6)
HCT VFR BLD AUTO: 40.9 % (ref 37.5–51)
HGB BLD-MCNC: 13.9 G/DL (ref 13–17.7)
INR PPP: 1.05 (ref 0.89–1.12)
MCH RBC QN AUTO: 30.5 PG (ref 26.6–33)
MCHC RBC AUTO-ENTMCNC: 34 G/DL (ref 31.5–35.7)
MCV RBC AUTO: 89.7 FL (ref 79–97)
PLATELET # BLD AUTO: 206 10*3/MM3 (ref 140–450)
PMV BLD AUTO: 10.3 FL (ref 6–12)
POTASSIUM SERPL-SCNC: 4.8 MMOL/L (ref 3.5–5.2)
PROTHROMBIN TIME: 13.8 SECONDS (ref 12.2–14.5)
RBC # BLD AUTO: 4.56 10*6/MM3 (ref 4.14–5.8)
SODIUM SERPL-SCNC: 141 MMOL/L (ref 136–145)
WBC NRBC COR # BLD AUTO: 6.19 10*3/MM3 (ref 3.4–10.8)

## 2023-12-04 PROCEDURE — 80048 BASIC METABOLIC PNL TOTAL CA: CPT

## 2023-12-04 PROCEDURE — 36415 COLL VENOUS BLD VENIPUNCTURE: CPT

## 2023-12-04 PROCEDURE — 83036 HEMOGLOBIN GLYCOSYLATED A1C: CPT

## 2023-12-04 PROCEDURE — 85027 COMPLETE CBC AUTOMATED: CPT

## 2023-12-04 PROCEDURE — 85610 PROTHROMBIN TIME: CPT

## 2023-12-04 NOTE — PAT
An arrival time for procedure was not provided during PAT visit. If patient had any questions or concerns about their arrival time, they were instructed to contact their surgeon/physician.  Additionally, if the patient referred to an arrival time that was acquired from their my chart account, patient was encouraged to verify that time with their surgeon/physician. Arrival times are NOT provided in Pre Admission Testing Department.    Patient viewed general PAT education video as instructed in their preoperative information received from their surgeon.  Patient stated the general PAT education video was viewed in its entirety and survey completed.  Copies of PAT general education handouts (Incentive Spirometry, Meds to Beds Program, Patient Belongings, Pre-op skin preparation instructions, Blood Glucose testing, Visitor policy, Surgery FAQ, Code H) distributed to patient if not printed. Education related to the PAT pass and skin preparation for surgery (if applicable) completed in PAT as a reinforcement to PAT education video. Patient instructed to return PAT pass provided today as well as completed skin preparation sheet (if applicable) on the day of procedure.     Additionally if patient had not viewed video yet but intended to view it at home or in our waiting area, then referred them to the handout with QR code/link provided during PAT visit.  Instructed patient to complete survey after viewing the video in its entirety.  Encouraged patient/family to read PAT general education handouts thoroughly and notify PAT staff with any questions or concerns. Patient verbalized understanding of all information and priority content.    Patient denies any current skin issues.

## 2023-12-07 ENCOUNTER — ANESTHESIA EVENT (OUTPATIENT)
Dept: PERIOP | Facility: HOSPITAL | Age: 73
End: 2023-12-07
Payer: MEDICARE

## 2023-12-07 ENCOUNTER — ANESTHESIA (OUTPATIENT)
Dept: PERIOP | Facility: HOSPITAL | Age: 73
End: 2023-12-07
Payer: MEDICARE

## 2023-12-07 ENCOUNTER — HOSPITAL ENCOUNTER (OUTPATIENT)
Facility: HOSPITAL | Age: 73
Discharge: HOME | End: 2023-12-07
Attending: UROLOGY | Admitting: UROLOGY
Payer: MEDICARE

## 2023-12-07 VITALS
WEIGHT: 142 LBS | TEMPERATURE: 98.8 F | OXYGEN SATURATION: 96 % | SYSTOLIC BLOOD PRESSURE: 151 MMHG | BODY MASS INDEX: 26.81 KG/M2 | HEART RATE: 59 BPM | RESPIRATION RATE: 17 BRPM | DIASTOLIC BLOOD PRESSURE: 79 MMHG | HEIGHT: 61 IN

## 2023-12-07 DIAGNOSIS — R35.0 BENIGN PROSTATIC HYPERPLASIA WITH URINARY FREQUENCY: ICD-10-CM

## 2023-12-07 DIAGNOSIS — N40.1 BENIGN PROSTATIC HYPERPLASIA WITH URINARY FREQUENCY: ICD-10-CM

## 2023-12-07 PROCEDURE — 25010000002 FENTANYL CITRATE (PF) 100 MCG/2ML SOLUTION

## 2023-12-07 PROCEDURE — 25010000002 FUROSEMIDE PER 20 MG

## 2023-12-07 PROCEDURE — 87086 URINE CULTURE/COLONY COUNT: CPT | Performed by: UROLOGY

## 2023-12-07 PROCEDURE — 63710000001 FAMOTIDINE 20 MG TABLET: Performed by: ANESTHESIOLOGY

## 2023-12-07 PROCEDURE — A9270 NON-COVERED ITEM OR SERVICE: HCPCS | Performed by: ANESTHESIOLOGY

## 2023-12-07 PROCEDURE — 25810000003 LACTATED RINGERS PER 1000 ML

## 2023-12-07 PROCEDURE — 25010000002 FENTANYL CITRATE (PF) 50 MCG/ML SOLUTION

## 2023-12-07 PROCEDURE — 52648 LASER SURGERY OF PROSTATE: CPT | Performed by: UROLOGY

## 2023-12-07 PROCEDURE — 25010000002 CEFAZOLIN PER 500 MG: Performed by: UROLOGY

## 2023-12-07 PROCEDURE — 25010000002 PROPOFOL 10 MG/ML EMULSION

## 2023-12-07 PROCEDURE — 25010000002 ONDANSETRON PER 1 MG

## 2023-12-07 RX ORDER — LIDOCAINE HYDROCHLORIDE 10 MG/ML
0.5 INJECTION, SOLUTION EPIDURAL; INFILTRATION; INTRACAUDAL; PERINEURAL ONCE AS NEEDED
Status: COMPLETED | OUTPATIENT
Start: 2023-12-07 | End: 2023-12-07

## 2023-12-07 RX ORDER — SODIUM CHLORIDE 9 MG/ML
40 INJECTION, SOLUTION INTRAVENOUS AS NEEDED
Status: DISCONTINUED | OUTPATIENT
Start: 2023-12-07 | End: 2023-12-07 | Stop reason: HOSPADM

## 2023-12-07 RX ORDER — TRAMADOL HYDROCHLORIDE 50 MG/1
50 TABLET ORAL EVERY 6 HOURS PRN
Qty: 12 TABLET | Refills: 0 | Status: SHIPPED | OUTPATIENT
Start: 2023-12-07

## 2023-12-07 RX ORDER — MAGNESIUM HYDROXIDE 1200 MG/15ML
LIQUID ORAL AS NEEDED
Status: DISCONTINUED | OUTPATIENT
Start: 2023-12-07 | End: 2023-12-07 | Stop reason: HOSPADM

## 2023-12-07 RX ORDER — FENTANYL CITRATE 50 UG/ML
INJECTION, SOLUTION INTRAMUSCULAR; INTRAVENOUS AS NEEDED
Status: DISCONTINUED | OUTPATIENT
Start: 2023-12-07 | End: 2023-12-07 | Stop reason: SURG

## 2023-12-07 RX ORDER — ONDANSETRON 2 MG/ML
INJECTION INTRAMUSCULAR; INTRAVENOUS AS NEEDED
Status: DISCONTINUED | OUTPATIENT
Start: 2023-12-07 | End: 2023-12-07 | Stop reason: SURG

## 2023-12-07 RX ORDER — HYDRALAZINE HYDROCHLORIDE 20 MG/ML
5 INJECTION INTRAMUSCULAR; INTRAVENOUS
Status: CANCELLED | OUTPATIENT
Start: 2023-12-07

## 2023-12-07 RX ORDER — HYDROMORPHONE HYDROCHLORIDE 1 MG/ML
0.5 INJECTION, SOLUTION INTRAMUSCULAR; INTRAVENOUS; SUBCUTANEOUS
Status: DISCONTINUED | OUTPATIENT
Start: 2023-12-07 | End: 2023-12-07 | Stop reason: HOSPADM

## 2023-12-07 RX ORDER — DOCUSATE SODIUM 100 MG/1
100 CAPSULE, LIQUID FILLED ORAL 3 TIMES DAILY PRN
Qty: 30 CAPSULE | Refills: 1 | Status: SHIPPED | OUTPATIENT
Start: 2023-12-07

## 2023-12-07 RX ORDER — LIDOCAINE HYDROCHLORIDE 10 MG/ML
INJECTION, SOLUTION EPIDURAL; INFILTRATION; INTRACAUDAL; PERINEURAL AS NEEDED
Status: DISCONTINUED | OUTPATIENT
Start: 2023-12-07 | End: 2023-12-07 | Stop reason: SURG

## 2023-12-07 RX ORDER — NALOXONE HCL 0.4 MG/ML
0.4 VIAL (ML) INJECTION AS NEEDED
Status: CANCELLED | OUTPATIENT
Start: 2023-12-07

## 2023-12-07 RX ORDER — OXYBUTYNIN CHLORIDE 5 MG/1
5 TABLET ORAL EVERY 8 HOURS PRN
Qty: 20 TABLET | Refills: 0 | Status: SHIPPED | OUTPATIENT
Start: 2023-12-07

## 2023-12-07 RX ORDER — FUROSEMIDE 10 MG/ML
INJECTION INTRAMUSCULAR; INTRAVENOUS AS NEEDED
Status: DISCONTINUED | OUTPATIENT
Start: 2023-12-07 | End: 2023-12-07 | Stop reason: SURG

## 2023-12-07 RX ORDER — DROPERIDOL 2.5 MG/ML
0.62 INJECTION, SOLUTION INTRAMUSCULAR; INTRAVENOUS ONCE AS NEEDED
Status: CANCELLED | OUTPATIENT
Start: 2023-12-07

## 2023-12-07 RX ORDER — MEPERIDINE HYDROCHLORIDE 25 MG/ML
12.5 INJECTION INTRAMUSCULAR; INTRAVENOUS; SUBCUTANEOUS
Status: CANCELLED | OUTPATIENT
Start: 2023-12-07 | End: 2023-12-08

## 2023-12-07 RX ORDER — HYDROCODONE BITARTRATE AND ACETAMINOPHEN 5; 325 MG/1; MG/1
1 TABLET ORAL ONCE AS NEEDED
Status: CANCELLED | OUTPATIENT
Start: 2023-12-07 | End: 2023-12-17

## 2023-12-07 RX ORDER — SODIUM CHLORIDE 9 MG/ML
40 INJECTION, SOLUTION INTRAVENOUS AS NEEDED
Status: CANCELLED | OUTPATIENT
Start: 2023-12-07

## 2023-12-07 RX ORDER — PROMETHAZINE HYDROCHLORIDE 25 MG/1
25 TABLET ORAL ONCE AS NEEDED
Status: CANCELLED | OUTPATIENT
Start: 2023-12-07

## 2023-12-07 RX ORDER — PROMETHAZINE HYDROCHLORIDE 25 MG/1
25 SUPPOSITORY RECTAL ONCE AS NEEDED
Status: CANCELLED | OUTPATIENT
Start: 2023-12-07

## 2023-12-07 RX ORDER — PROPOFOL 10 MG/ML
VIAL (ML) INTRAVENOUS AS NEEDED
Status: DISCONTINUED | OUTPATIENT
Start: 2023-12-07 | End: 2023-12-07 | Stop reason: SURG

## 2023-12-07 RX ORDER — FAMOTIDINE 10 MG/ML
20 INJECTION, SOLUTION INTRAVENOUS ONCE
Status: CANCELLED | OUTPATIENT
Start: 2023-12-07 | End: 2023-12-07

## 2023-12-07 RX ORDER — FENTANYL CITRATE 50 UG/ML
INJECTION, SOLUTION INTRAMUSCULAR; INTRAVENOUS
Status: COMPLETED
Start: 2023-12-07 | End: 2023-12-07

## 2023-12-07 RX ORDER — DROPERIDOL 2.5 MG/ML
0.62 INJECTION, SOLUTION INTRAMUSCULAR; INTRAVENOUS
Status: CANCELLED | OUTPATIENT
Start: 2023-12-07

## 2023-12-07 RX ORDER — PHENAZOPYRIDINE HYDROCHLORIDE 100 MG/1
100 TABLET, FILM COATED ORAL 3 TIMES DAILY PRN
Qty: 20 TABLET | Refills: 0 | Status: SHIPPED | OUTPATIENT
Start: 2023-12-07

## 2023-12-07 RX ORDER — IPRATROPIUM BROMIDE AND ALBUTEROL SULFATE 2.5; .5 MG/3ML; MG/3ML
3 SOLUTION RESPIRATORY (INHALATION) ONCE AS NEEDED
Status: CANCELLED | OUTPATIENT
Start: 2023-12-07

## 2023-12-07 RX ORDER — CEPHALEXIN 500 MG/1
500 CAPSULE ORAL 2 TIMES DAILY
Qty: 10 CAPSULE | Refills: 0 | Status: SHIPPED | OUTPATIENT
Start: 2023-12-07 | End: 2023-12-12

## 2023-12-07 RX ORDER — SODIUM CHLORIDE, SODIUM LACTATE, POTASSIUM CHLORIDE, CALCIUM CHLORIDE 600; 310; 30; 20 MG/100ML; MG/100ML; MG/100ML; MG/100ML
9 INJECTION, SOLUTION INTRAVENOUS CONTINUOUS
Status: DISCONTINUED | OUTPATIENT
Start: 2023-12-07 | End: 2023-12-07 | Stop reason: HOSPADM

## 2023-12-07 RX ORDER — FAMOTIDINE 20 MG/1
20 TABLET, FILM COATED ORAL ONCE
Status: COMPLETED | OUTPATIENT
Start: 2023-12-07 | End: 2023-12-07

## 2023-12-07 RX ORDER — ONDANSETRON 2 MG/ML
4 INJECTION INTRAMUSCULAR; INTRAVENOUS ONCE AS NEEDED
Status: CANCELLED | OUTPATIENT
Start: 2023-12-07

## 2023-12-07 RX ORDER — HYOSCYAMINE SULFATE 0.12 MG/1
1 TABLET SUBLINGUAL EVERY 4 HOURS PRN
Qty: 30 EACH | Refills: 0 | Status: SHIPPED | OUTPATIENT
Start: 2023-12-07

## 2023-12-07 RX ORDER — HYDROMORPHONE HYDROCHLORIDE 1 MG/ML
0.5 INJECTION, SOLUTION INTRAMUSCULAR; INTRAVENOUS; SUBCUTANEOUS
Status: CANCELLED | OUTPATIENT
Start: 2023-12-07

## 2023-12-07 RX ORDER — FENTANYL CITRATE 50 UG/ML
50 INJECTION, SOLUTION INTRAMUSCULAR; INTRAVENOUS
Status: CANCELLED | OUTPATIENT
Start: 2023-12-07

## 2023-12-07 RX ORDER — SODIUM CHLORIDE 0.9 % (FLUSH) 0.9 %
3-10 SYRINGE (ML) INJECTION AS NEEDED
Status: CANCELLED | OUTPATIENT
Start: 2023-12-07

## 2023-12-07 RX ORDER — EPHEDRINE SULFATE 50 MG/ML
INJECTION INTRAVENOUS AS NEEDED
Status: DISCONTINUED | OUTPATIENT
Start: 2023-12-07 | End: 2023-12-07 | Stop reason: SURG

## 2023-12-07 RX ORDER — SODIUM CHLORIDE, SODIUM LACTATE, POTASSIUM CHLORIDE, CALCIUM CHLORIDE 600; 310; 30; 20 MG/100ML; MG/100ML; MG/100ML; MG/100ML
INJECTION, SOLUTION INTRAVENOUS CONTINUOUS PRN
Status: DISCONTINUED | OUTPATIENT
Start: 2023-12-07 | End: 2023-12-07 | Stop reason: SURG

## 2023-12-07 RX ORDER — LIDOCAINE HYDROCHLORIDE 20 MG/ML
JELLY TOPICAL AS NEEDED
Status: DISCONTINUED | OUTPATIENT
Start: 2023-12-07 | End: 2023-12-07 | Stop reason: HOSPADM

## 2023-12-07 RX ORDER — LABETALOL HYDROCHLORIDE 5 MG/ML
5 INJECTION, SOLUTION INTRAVENOUS
Status: CANCELLED | OUTPATIENT
Start: 2023-12-07

## 2023-12-07 RX ORDER — SODIUM CHLORIDE 0.9 % (FLUSH) 0.9 %
10 SYRINGE (ML) INJECTION EVERY 12 HOURS SCHEDULED
Status: DISCONTINUED | OUTPATIENT
Start: 2023-12-07 | End: 2023-12-07 | Stop reason: HOSPADM

## 2023-12-07 RX ORDER — SODIUM CHLORIDE 0.9 % (FLUSH) 0.9 %
10 SYRINGE (ML) INJECTION AS NEEDED
Status: DISCONTINUED | OUTPATIENT
Start: 2023-12-07 | End: 2023-12-07 | Stop reason: HOSPADM

## 2023-12-07 RX ORDER — MIDAZOLAM HYDROCHLORIDE 1 MG/ML
0.5 INJECTION INTRAMUSCULAR; INTRAVENOUS
Status: DISCONTINUED | OUTPATIENT
Start: 2023-12-07 | End: 2023-12-07 | Stop reason: HOSPADM

## 2023-12-07 RX ORDER — FENTANYL CITRATE 50 UG/ML
50 INJECTION, SOLUTION INTRAMUSCULAR; INTRAVENOUS
Status: DISCONTINUED | OUTPATIENT
Start: 2023-12-07 | End: 2023-12-07 | Stop reason: HOSPADM

## 2023-12-07 RX ORDER — SODIUM CHLORIDE 0.9 % (FLUSH) 0.9 %
3 SYRINGE (ML) INJECTION EVERY 12 HOURS SCHEDULED
Status: CANCELLED | OUTPATIENT
Start: 2023-12-07

## 2023-12-07 RX ADMIN — EPHEDRINE SULFATE 5 MG: 50 INJECTION INTRAVENOUS at 14:35

## 2023-12-07 RX ADMIN — PROPOFOL 300 MG: 10 INJECTION, EMULSION INTRAVENOUS at 13:53

## 2023-12-07 RX ADMIN — ONDANSETRON 4 MG: 2 INJECTION INTRAMUSCULAR; INTRAVENOUS at 14:42

## 2023-12-07 RX ADMIN — LIDOCAINE HYDROCHLORIDE 50 MG: 10 INJECTION, SOLUTION EPIDURAL; INFILTRATION; INTRACAUDAL; PERINEURAL at 13:53

## 2023-12-07 RX ADMIN — EPHEDRINE SULFATE 5 MG: 50 INJECTION INTRAVENOUS at 14:15

## 2023-12-07 RX ADMIN — SODIUM CHLORIDE, POTASSIUM CHLORIDE, SODIUM LACTATE AND CALCIUM CHLORIDE: 600; 310; 30; 20 INJECTION, SOLUTION INTRAVENOUS at 13:48

## 2023-12-07 RX ADMIN — FENTANYL CITRATE 50 MCG: 50 INJECTION, SOLUTION INTRAMUSCULAR; INTRAVENOUS at 14:27

## 2023-12-07 RX ADMIN — FENTANYL CITRATE 50 MCG: 50 INJECTION, SOLUTION INTRAMUSCULAR; INTRAVENOUS at 15:59

## 2023-12-07 RX ADMIN — LIDOCAINE HYDROCHLORIDE 0.5 ML: 10 INJECTION, SOLUTION EPIDURAL; INFILTRATION; INTRACAUDAL; PERINEURAL at 12:47

## 2023-12-07 RX ADMIN — SODIUM CHLORIDE 2000 MG: 900 INJECTION INTRAVENOUS at 13:58

## 2023-12-07 RX ADMIN — FENTANYL CITRATE 50 MCG: 50 INJECTION, SOLUTION INTRAMUSCULAR; INTRAVENOUS at 13:53

## 2023-12-07 RX ADMIN — FAMOTIDINE 20 MG: 20 TABLET ORAL at 12:47

## 2023-12-07 RX ADMIN — FUROSEMIDE 20 MG: 10 INJECTION, SOLUTION INTRAMUSCULAR; INTRAVENOUS at 14:40

## 2023-12-07 NOTE — OP NOTE
CYSTOSCOPY TRANSURETHRAL RESECTION OF PROSTATE GREENLIGHT  Procedure Note    Elliot Jean Baptiste  12/7/2023    Pre-op Diagnosis:   Benign prostatic hyperplasia with urinary frequency [N40.1, R35.0]    Post-op Diagnosis:     Post-Op Diagnosis Codes:     * Benign prostatic hyperplasia with urinary frequency [N40.1, R35.0]    Procedure/CPT® Codes:      Procedure(s):  CYSTOSCOPY TRANSURETHRAL RESECTION OF PROSTATE GREENLIGHT    Surgeon(s):  Cheri Dubon MD    Anesthesia: see anesthesia record    Staff:   Circulator: Rciky Chance RN; Grace Pavon RN  Scrub Person: Raffy Maravilla    Estimated Blood Loss: minimal  Urine Voided: * No values recorded between 12/7/2023  1:48 PM and 12/7/2023  2:50 PM *    Specimens:                ID Type Source Tests Collected by Time   1 : Urine for culture Urine Urine, Catheter URINE CULTURE Cheri Dubon MD 12/7/2023 1405         Drains: 20 f park catheter with clear urine draining    Findings:   Cystoscopy with no masses or lesions noted within the walls of the bladder.  Heavily trabeculated bladder.  Complete lateral lobe coaptation of the prostate with median lobe.  Complete vaporization with wide open prostatic channel at the end of case.  Excellent hemostasis at the end of the case with clear urine draining  No evidence of injury to the bilateral ureteral orifices or bladder at the end of the case    Blood: N/A    Complications: None immediate    Indication: Mr. Jean Baptiste is a 73-year-old gentleman with obstructive lower urinary tract symptoms due to BPH.  He has been on maximal medical therapy without significant relief.  After hearing his options he elected to move forward with cystoscopy, greenlight laser vaporization of the prostate.    Description of Procedure: The patient was seen and examined in the preoperative area.  The risk, benefits, and alternatives were explained to the patient and his family and informed consent was obtained.  He was then taken to the operating  theater placed on the table in a supine position.  Venodyne boots were placed on the bilateral lower extremities and general anesthesia was induced without any complications.  He was placed in the dorsal lithotomy position with all pressure points padded and secured.  He was prepped and draped in the usual sterile fashion and a timeout was taken.     A 20 [place] rigid greenlight scope was then passed through the urethra and into the bladder.  A cystoscopy was performed in a systematic fashion and there were no masses or lesions noted within the walls of the bladder.  We then inserted the greenlight laser fiber and using a setting of 80 began our resection of the lateral lobes at the bladder neck.  Once this was vaporized we increased the setting to 100 J and completed our vaporization.  This was carried from the bladder neck all the way to the verumontanum being careful not to vaporize beyond the veru.  Once they large channel had been created the water was turned off to assess for any bleeding.  Any sites of bleeders were cauterized.  The bladder was drained and the area was inspected again and hemostasis was excellent.  The bladder was reinspected for any injury of which there was none.  Both the ureteral orifices were intact.  The bladder was filled and the scope was withdrawn.  A 20 [place] Bust[place]ante catheter was inserted with clear urine draining.  30 cc was instilled into the balloon.  He was given lidocaine jelly as well as 20 mg of Lasix.  The patient tolerated the procedure well and there was no complication to the procedure.  He was taken the PACU in stable and extubated condition.    Disposition: The patient will be discharged home once PACU criteria is met.  He will follow-up in our office on 12/11/2023 for catheter removal.  I will see him back in approximately 2 weeks.  He was given prescription for Keflex, tramadol, Vitravene, Levsin, and Pyridium.  The intraoperative findings and postoperative plans were  discussed with the patient and family.    Cheri Dubon MD     Date: 12/7/2023  Time: 14:50 EST

## 2023-12-07 NOTE — ANESTHESIA POSTPROCEDURE EVALUATION
Patient: Elliot Jean Baptiste    Procedure Summary       Date: 12/07/23 Room / Location:  [facility] / [facility]    Anesthesia Start: 1348 Anesthesia Stop:     Procedure: CYSTOSCOPY TRANSURETHRAL RESECTION OF PROSTATE GREENLIGHT Diagnosis:       Benign prostatic hyperplasia with urinary frequency      (Benign prostatic hyperplasia with urinary frequency [N40.1, R35.0])    Surgeons: Cheri Dubon MD Provider: Shari Thomas MD    Anesthesia Type: general ASA Status: 2            Anesthesia Type: general    Vitals  Vitals Value Taken Time   /72 12/07/23 1501   Temp     Pulse 62 12/07/23 1504   Resp     SpO2 98 % 12/07/23 1504   Vitals shown include unfiled device data.    T= 97.6    Post Anesthesia Care and Evaluation    Patient location during evaluation: PACU  Patient participation: complete - patient participated  Level of consciousness: awake and alert  Pain management: adequate    Airway patency: patent  Anesthetic complications: No anesthetic complications  PONV Status: none  Cardiovascular status: hemodynamically stable and acceptable  Respiratory status: nonlabored ventilation, acceptable and nasal cannula  Hydration status: acceptable

## 2023-12-07 NOTE — DISCHARGE INSTRUCTIONS
Dr. Dubon's Office [phone]                     Dr. Dubon Off ice will call you with follow-up time for monday

## 2023-12-07 NOTE — INTERVAL H&P NOTE
[facility] Pre-op    Full history and physical note from office is attached.    VS: /85  HR 58  RR 16  T 97.5  Sat 97%RA    LAB Results:  Lab Results   Component Value Date    WBC 6.19 12/04/2023    HGB 13.9 12/04/2023    HCT 40.9 12/04/2023    MCV 89.7 12/04/2023     12/04/2023    NEUTROABS 1.72 10/11/2023    GLUCOSE 125 (H) 12/04/2023    BUN 28 (H) 12/04/2023    CREATININE 1.53 (H) 12/04/2023    EGFRIFNONA 46 (L) 04/09/2019     12/04/2023    K 4.8 12/04/2023     12/04/2023    CO2 27.0 12/04/2023    CALCIUM 9.8 12/04/2023    ALBUMIN 4.2 10/11/2023    AST 19 10/11/2023    ALT 16 10/11/2023    BILITOT 1.4 (H) 10/11/2023    INR 1.05 12/04/2023       Cancer Staging (if applicable)  Cancer Patient: __ yes __no __unknown__N/A; If yes, clinical stage T:__ N:__M:__, stage group or __N/A      Impression: Benign prostatic hyperplasia with urinary frequency       Plan: CYSTOSCOPY TRANSURETHRAL RESECTION OF PROSTATE LEBRON Hernandez   12/7/2023   12:25 EST

## 2023-12-07 NOTE — ANESTHESIA PREPROCEDURE EVALUATION
Anesthesia Evaluation     Patient summary reviewed and Nursing notes reviewed   no history of anesthetic complications:   NPO Solid Status: > 8 hours  NPO Liquid Status: > 8 hours           Airway   Mallampati: I  TM distance: >3 FB  Neck ROM: full  No difficulty expected  Dental - normal exam     Pulmonary - negative pulmonary ROS and normal exam   Cardiovascular - normal exam    ECG reviewed    (+) hypertension, hyperlipidemia      Neuro/Psych  (+) headaches, psychiatric history Depression  (-) seizures  GI/Hepatic/Renal/Endo    (+) renal disease-    Musculoskeletal     Abdominal    Substance History      OB/GYN          Other   arthritis,   history of cancer                Anesthesia Plan    ASA 2     general     intravenous induction     Anesthetic plan, risks, benefits, and alternatives have been provided, discussed and informed consent has been obtained with: patient.    Plan discussed with CRNA.    CODE STATUS:

## 2023-12-07 NOTE — ANESTHESIA PROCEDURE NOTES
Airway  Urgency: elective    Date/Time: 12/7/2023 1:54 PM  Airway not difficult    General Information and Staff    Patient location during procedure: OR    Indications and Patient Condition  Indications for airway management: airway protection    Preoxygenated: yes  MILS maintained throughout  Mask difficulty assessment: 0 - not attempted    Final Airway Details  Final airway type: supraglottic airway      Successful airway: I-gel  Size 4     Number of attempts at approach: 1  Assessment: lips, teeth, and gum same as pre-op    Additional Comments  LMA placed without difficulty, ventilation with assist, equal breath sounds and symmetric chest rise and fall

## 2023-12-08 LAB — BACTERIA SPEC AEROBE CULT: NO GROWTH

## 2023-12-11 ENCOUNTER — CLINICAL SUPPORT (OUTPATIENT)
Dept: UROLOGY | Facility: CLINIC | Age: 73
End: 2023-12-11
Payer: MEDICARE

## 2023-12-11 DIAGNOSIS — N40.1 BENIGN PROSTATIC HYPERPLASIA WITH URINARY FREQUENCY: ICD-10-CM

## 2023-12-11 DIAGNOSIS — R35.0 BENIGN PROSTATIC HYPERPLASIA WITH URINARY FREQUENCY: ICD-10-CM

## 2023-12-11 DIAGNOSIS — Z46.6 ENCOUNTER FOR FOLEY CATHETER REMOVAL: Primary | ICD-10-CM

## 2023-12-11 PROCEDURE — 51700 IRRIGATION OF BLADDER: CPT | Performed by: UROLOGY

## 2023-12-11 NOTE — PROGRESS NOTES
PT presented to our office for park catheter removal and voiding trial. . PT's park catheter was draining orange urine without Clots. I first explained to the PT what I will be doing throughout the voiding trial and answered any questions PT had. Then I removed the bag from the catheter and using a piston syringe, inserted 125 mL of sterile water into the PT's bladder. When PT notified me they began to feel a strong urge to urinate, I deflated the catheter balloon holding 25 mL of water. Once the balloon was empty, I removed the catheter in a smooth and gentle movement. I instructed the PT to try and urinate into the hand-held urinal . PT was able to void 150 mL of orange urine 1 small blood clot. PT tolerated well. I advised PT to drink lots of water, rest, call us if they are suddenly unable to urinate, or if large amounts of blood/clots are suddenly in urine. PT verbalized understanding.

## 2023-12-22 ENCOUNTER — OFFICE VISIT (OUTPATIENT)
Dept: UROLOGY | Facility: CLINIC | Age: 73
End: 2023-12-22
Payer: MEDICARE

## 2023-12-22 VITALS
HEART RATE: 76 BPM | BODY MASS INDEX: 26.81 KG/M2 | WEIGHT: 142 LBS | HEIGHT: 61 IN | SYSTOLIC BLOOD PRESSURE: 128 MMHG | DIASTOLIC BLOOD PRESSURE: 72 MMHG | OXYGEN SATURATION: 98 %

## 2023-12-22 DIAGNOSIS — R35.0 BENIGN PROSTATIC HYPERPLASIA WITH URINARY FREQUENCY: Primary | ICD-10-CM

## 2023-12-22 DIAGNOSIS — N40.1 BENIGN PROSTATIC HYPERPLASIA WITH URINARY FREQUENCY: Primary | ICD-10-CM

## 2023-12-22 LAB
BILIRUB BLD-MCNC: NEGATIVE MG/DL
CLARITY, POC: CLEAR
COLOR UR: YELLOW
EXPIRATION DATE: ABNORMAL
GLUCOSE UR STRIP-MCNC: NEGATIVE MG/DL
KETONES UR QL: NEGATIVE
LEUKOCYTE EST, POC: ABNORMAL
Lab: ABNORMAL
NITRITE UR-MCNC: NEGATIVE MG/ML
PH UR: 5.5 [PH] (ref 5–8)
PROT UR STRIP-MCNC: ABNORMAL MG/DL
RBC # UR STRIP: ABNORMAL /UL
SP GR UR: 1.02 (ref 1–1.03)
UROBILINOGEN UR QL: NORMAL

## 2023-12-22 PROCEDURE — 87086 URINE CULTURE/COLONY COUNT: CPT | Performed by: UROLOGY

## 2023-12-22 RX ORDER — SULFAMETHOXAZOLE AND TRIMETHOPRIM 800; 160 MG/1; MG/1
1 TABLET ORAL 2 TIMES DAILY
Qty: 10 TABLET | Refills: 0 | Status: SHIPPED | OUTPATIENT
Start: 2023-12-22 | End: 2023-12-27

## 2023-12-22 NOTE — PROGRESS NOTES
Follow Up Office Visit      Patient Name: Elliot Jean Baptiste  : 1950   MRN: [id number]     Chief Complaint:    Chief Complaint   Patient presents with    Benign prostatic hyperplasia with urinary frequency       Referring Provider: No ref. provider found    History of Present Illness: Elliot Jean Baptiste is a 73 y.o. male who presents today for follow up of  after greenlight PVP.  He reports he has done extremely well and is very happy with his symptoms.  His stream is much stronger and he is emptying well.  He does have mild dysuria and some urgency.   His U/A has LE today but no nitrite.     Subjective      Review of System:   Review of Systems   Constitutional: Negative.    HENT: Negative.     Eyes: Negative.    Respiratory:  Positive for cough.    Cardiovascular: Negative.    Gastrointestinal: Negative.    Endocrine: Negative.    Genitourinary:  Positive for dysuria and urgency.   Musculoskeletal: Negative.    Skin: Negative.    Allergic/Immunologic: Negative.    Neurological: Negative.    Hematological: Negative.    Psychiatric/Behavioral: Negative.        I have reviewed the ROS documented by my clinical staff, I have updated appropriately and I agree. Cheri Dubon MD    I have reviewed and the following portions of the patient's history were updated as appropriate: past family history, past medical history, past social history, past surgical history and problem list.    Past Medical History:   Past Medical History:   Diagnosis Date    Arthritis     Cancer     skin    Colon polyp     Depression     Enlarged prostate     Gallstones     History of medical problems     poor blood clotting     Hypertension     Kidney infection     Kidney stone     Migraine        Past Surgical History:  Past Surgical History:   Procedure Laterality Date    APPENDECTOMY      CATARACT EXTRACTION Bilateral     COLONOSCOPY      CYSTOSCOPY TRANSURETHRAL RESECTION OF PROSTATE N/A 2023    Procedure: CYSTOSCOPY  TRANSURETHRAL RESECTION OF PROSTATE GREENLIGHT;  Surgeon: Cheri Dubon MD;  Location: [facility];  Service: Urology;  Laterality: N/A;    GALLBLADDER SURGERY      HEMORRHOIDECTOMY      KIDNEY STONE SURGERY      KNEE CARTILAGE SURGERY Left     ULNAR NERVE REPAIR Right        Family History:   family history includes Cancer in his mother; Hyperlipidemia in his father; Hypertension in his father and mother; Migraines in his mother and sister; Osteoporosis in his mother.   Otherwise pertinent FHx was reviewed and not pertinent to current issue.    Social History:    reports that he has never smoked. He has been exposed to tobacco smoke. He has never used smokeless tobacco. He reports that he does not currently use alcohol. He reports that he does not use drugs.    Medications:     Current Outpatient Medications:     Cholecalciferol (VITAMIN D3) 5000 units capsule capsule, Take 1 capsule by mouth Daily., Disp: , Rfl:     finasteride (PROSCAR) 5 MG tablet, Take 1 tablet by mouth Daily., Disp: , Rfl:     pravastatin (PRAVACHOL) 40 MG tablet, Take 1 tablet by mouth Every Night., Disp: 90 tablet, Rfl: 3    tamsulosin (FLOMAX) 0.4 MG capsule 24 hr capsule, Take 1 capsule by mouth Daily., Disp: , Rfl:     docusate sodium (Colace) 100 MG capsule, Take 1 capsule by mouth 3 (Three) Times a Day As Needed for Constipation., Disp: 30 capsule, Rfl: 1    Hyoscyamine Sulfate SL (Levsin/SL) 0.125 MG sublingual tablet, Place 1 tablet under the tongue Every 4 (Four) Hours As Needed (Bladder spasm)., Disp: 30 each, Rfl: 0    oxybutynin (DITROPAN) 5 MG tablet, Take 1 tablet by mouth Every 8 (Eight) Hours As Needed (Bladder spasm)., Disp: 20 tablet, Rfl: 0    phenazopyridine (Pyridium) 100 MG tablet, Take 1 tablet by mouth 3 (Three) Times a Day As Needed (or Dysuria/Bladder pain)., Disp: 20 tablet, Rfl: 0    sulfamethoxazole-trimethoprim (Bactrim DS) 800-160 MG per tablet, Take 1 tablet by mouth 2 (Two) Times a Day for 5 days., Disp: 10  "tablet, Rfl: 0    traMADol (Ultram) 50 MG tablet, Take 1 tablet by mouth Every 6 (Six) Hours As Needed for Severe Pain., Disp: 12 tablet, Rfl: 0    Allergies:   No Known Allergies    IPSS Questionnaire (AUA-7):  Over the past month…    1)  Incomplete Emptying  How often have you had a sensation of not emptying your bladder?  2 - Less than half the time   2)  Frequency  How often have you had to urinate less than every two hours? 1 - Less than 1 time in 5   3)  Intermittency  How often have you found you stopped and started again several times when you urinated?  1 - Less than 1 time in 5   4) Urgency  How often have you found it difficult to postpone urination?  1 - Less than 1 time in 5   5) Weak Stream  How often have you had a weak urinary stream?  2 - Less than half the time   6) Straining  How often have you had to push or strain to begin urination?  0 - Not at all   7) Nocturia  How many times did you typically get up at night to urinate?  3 - 3 times   Total Score:  9   The International Prostate Symptom Score (IPSS) is used to screen, diagnose, track symptoms of benign prostatic hyperplasia (BPH).    0-7 pts (Mild Symptoms)  / 8-19 pts (Moderate) / 20-35 (Severe)    Quality of life due to urinary symptoms:  If you were to spend the rest of your life with your urinary condition the way it is now, how would you feel about that? 1-Pleased   Urine Leakage (Incontinence) 0-No Leakage         Post void residual bladder scan:   0 mL     Objective     Physical Exam:   Vital Signs:   Vitals:    12/22/23 1017   BP: 128/72   Pulse: 76   SpO2: 98%   Weight: 64.4 kg (142 lb)   Height: 154.9 cm (60.98\")   PainSc: 0-No pain     Body mass index is 26.84 kg/m².     Physical Exam  Vitals and nursing note reviewed.   Constitutional:       General: He is awake. He is not in acute distress.     Appearance: Normal appearance. He is well-developed.   HENT:      Head: Normocephalic and atraumatic.      Right Ear: External ear " normal.      Left Ear: External ear normal.      Nose: Nose normal.   Eyes:      Conjunctiva/sclera: Conjunctivae normal.   Pulmonary:      Effort: Pulmonary effort is normal.   Abdominal:      General: There is no distension.      Palpations: Abdomen is soft. There is no mass.      Tenderness: There is no abdominal tenderness. There is no right CVA tenderness, left CVA tenderness, guarding or rebound.      Hernia: No hernia is present. There is no hernia in the left inguinal area or right inguinal area.   Genitourinary:     Pubic Area: No rash.       Rectum: No mass or tenderness. Normal anal tone.   Musculoskeletal:      Cervical back: Normal range of motion.   Lymphadenopathy:      Lower Body: No right inguinal adenopathy. No left inguinal adenopathy.   Skin:     General: Skin is warm.   Neurological:      General: No focal deficit present.      Mental Status: He is alert and oriented to person, place, and time.   Psychiatric:         Behavior: Behavior normal. Behavior is cooperative.         Labs:   Brief Urine Lab Results  (Last result in the past 365 days)        Color   Clarity   Blood   Leuk Est   Nitrite   Protein   CREAT   Urine HCG        11/08/23 1102 Yellow   Clear   Negative   Negative   Negative   Negative                   Urine Culture          12/7/2023    14:05   Urine Culture   Urine Culture No growth         Lab Results   Component Value Date    GLUCOSE 125 (H) 12/04/2023    CALCIUM 9.8 12/04/2023     12/04/2023    K 4.8 12/04/2023    CO2 27.0 12/04/2023     12/04/2023    BUN 28 (H) 12/04/2023    CREATININE 1.53 (H) 12/04/2023    EGFRIFNONA 46 (L) 04/09/2019    BCR 18.3 12/04/2023    ANIONGAP 10.0 12/04/2023       Lab Results   Component Value Date    WBC 6.19 12/04/2023    HGB 13.9 12/04/2023    HCT 40.9 12/04/2023    MCV 89.7 12/04/2023     12/04/2023       Lab Results   Component Value Date    PSA 4.250 (H) 04/09/2019       Images:   MRI Abdomen With & Without  Contrast    Result Date: 10/13/2023  Impression: Benign hepatic cyst and hemangioma. No suspicious liver lesions. Benign renal cysts. No suspicious renal lesions. Electronically Signed: Luciano Arellano MD  10/13/2023 11:23 AM EDT  Workstation ID: [id number]      Measures:   Tobacco:   Elliot Jean Baptiste  reports that he has never smoked. He has been exposed to tobacco smoke. He has never used smokeless tobacco..    Urine Incontinence: Patient reports that he is not currently experiencing any symptoms of urinary incontinence.         Assessment / Plan      Assessment/Plan:   73 y.o. male who presented today for follow up after greenlight PVP.  He has done extremely well.  It appears he may have a UTI so I have sent in Bactrim empirically.  I will send his urine for culture.  I will plan on seeing him back in 3 months.  I have advised him to stop his finasteride.  We will stop his tamsulosin at his next visit.     Diagnoses and all orders for this visit:    1. Benign prostatic hyperplasia with urinary frequency (Primary)  -     sulfamethoxazole-trimethoprim (Bactrim DS) 800-160 MG per tablet; Take 1 tablet by mouth 2 (Two) Times a Day for 5 days.  Dispense: 10 tablet; Refill: 0         Follow Up:   Return in about 3 months (around 3/22/2024).    I spent approximately 20 minutes providing clinical care for this patient; including review of patient's chart and provider documentation, face to face time spent with patient in examination room (obtaining history, performing physical exam, discussing diagnosis and management options), placing orders, and completing patient documentation.     Cheri Dubon MD  [facility] [place] [place] [facility] [place]

## 2023-12-23 LAB — BACTERIA SPEC AEROBE CULT: NORMAL

## 2024-03-27 ENCOUNTER — OFFICE VISIT (OUTPATIENT)
Dept: UROLOGY | Facility: CLINIC | Age: 74
End: 2024-03-27
Payer: MEDICARE

## 2024-03-27 VITALS
SYSTOLIC BLOOD PRESSURE: 122 MMHG | BODY MASS INDEX: 27.9 KG/M2 | HEART RATE: 67 BPM | DIASTOLIC BLOOD PRESSURE: 66 MMHG | HEIGHT: 62 IN | OXYGEN SATURATION: 97 % | WEIGHT: 151.6 LBS

## 2024-03-27 DIAGNOSIS — R35.0 BENIGN PROSTATIC HYPERPLASIA WITH URINARY FREQUENCY: Primary | ICD-10-CM

## 2024-03-27 DIAGNOSIS — N40.1 BENIGN PROSTATIC HYPERPLASIA WITH URINARY FREQUENCY: Primary | ICD-10-CM

## 2024-03-27 LAB
BILIRUB BLD-MCNC: NEGATIVE MG/DL
CLARITY, POC: CLEAR
COLOR UR: YELLOW
EXPIRATION DATE: ABNORMAL
GLUCOSE UR STRIP-MCNC: NEGATIVE MG/DL
KETONES UR QL: NEGATIVE
LEUKOCYTE EST, POC: ABNORMAL
Lab: ABNORMAL
NITRITE UR-MCNC: NEGATIVE MG/ML
PH UR: 5.5 [PH] (ref 5–8)
PROT UR STRIP-MCNC: ABNORMAL MG/DL
RBC # UR STRIP: NEGATIVE /UL
SP GR UR: 1.02 (ref 1–1.03)
UROBILINOGEN UR QL: NORMAL

## 2024-03-27 NOTE — PROGRESS NOTES
Follow Up Office Visit      Patient Name: Elliot Jean Baptiste  : 1950   MRN: [id number]     Chief Complaint:    Chief Complaint   Patient presents with    Benign prostatic hyperplasia with urinary frequency       Referring Provider: No ref. provider found    History of Present Illness: Elliot Jean Baptiste is a 74 y.o. male who presents today for postoperative visit.  Status post uncomplicated greenlight PVP on 2023, for obstructive lower urinary tract symptoms secondary to BPH.  Reports continued significant improvement in his prior storage and voiding symptoms.  Continues to take tamsulosin.  Overall been doing well and denies any complaints today.  Denies dysuria or gross hematuria.    IPSS: 7  PVR:  0mL    Urinalysis:  Trace prot.  1+ leuks    Subjective      Review of System: Review of Systems   Genitourinary:  Negative for decreased urine volume, difficulty urinating, dysuria, enuresis, flank pain, frequency, hematuria and urgency.      I have reviewed the ROS documented by my clinical staff, I have updated appropriately and I agree. Harrison Junior PA-C    I have reviewed and the following portions of the patient's history were updated as appropriate: past family history, past medical history, past social history, past surgical history and problem list.    Medications:     Current Outpatient Medications:     Cholecalciferol (VITAMIN D3) 5000 units capsule capsule, Take 1 capsule by mouth Daily., Disp: , Rfl:     pravastatin (PRAVACHOL) 40 MG tablet, Take 1 tablet by mouth Every Night., Disp: 90 tablet, Rfl: 3    tamsulosin (FLOMAX) 0.4 MG capsule 24 hr capsule, Take 1 capsule by mouth Daily., Disp: , Rfl:     finasteride (PROSCAR) 5 MG tablet, Take 1 tablet by mouth Daily., Disp: , Rfl:     Allergies:   No Known Allergies    IPSS Questionnaire (AUA-7):  Over the past month…    1)  Incomplete Emptying:       How often have you had a sensation of not emptying you had the sensation of not emptying  "your bladder completely after you finished urinating?  0 - Not at all   2)  Frequency:       How often have you had the urinate again less than two hours after you finished urinating?  1 - Less than 1 time in 5   3)  Intermittency:       How often have you found you stopped and started again several times when you urinated?   2 - Less than half the time   4) Urgency:      How often have you found it difficult to postpone urination?  0 - Not at all   5) Weak Stream:      How often have you had a weak urinary stream?  2 - Less than half the time   6) Straining:       How often have you had to push or strain to begin urination?  0 - Not at all   7) Nocturia:      How many times did you most typically get up to urinate from the time you went to bed at night until the time you got up in the morning?  2 - 2 times   Total Score:  7   The International Prostate Symptom Score (IPSS) is used to screen, diagnose, track symptoms of benign prostatic hyperplasia (BPH).   0-7 (Mild Symptoms) 8-19 (Moderate) 20-35 (Severe)   Quality of Life (QoL):  If you were to spend the rest of your life with your urinary condition just the way it is now, how would you feel about that? 1-Pleased   Urine Leakage (Incontinence) 0-No Leakage         Post void residual bladder scan:   0 ml    Objective     Physical Exam:   Vital Signs:   Vitals:    03/27/24 1028   BP: 122/66   Pulse: 67   SpO2: 97%   Weight: 68.8 kg (151 lb 9.6 oz)   Height: 157.5 cm (62\")     Body mass index is 27.73 kg/m².     Physical Exam  Constitutional:       Appearance: Normal appearance.   HENT:      Head: Normocephalic and atraumatic.      Nose: Nose normal.   Eyes:      Extraocular Movements: Extraocular movements intact.      Conjunctiva/sclera: Conjunctivae normal.      Pupils: Pupils are equal, round, and reactive to light.   Musculoskeletal:         General: Normal range of motion.      Cervical back: Normal range of motion and neck supple.   Skin:     General: Skin is " warm and dry.      Findings: No lesion or rash.   Neurological:      General: No focal deficit present.      Mental Status: He is alert and oriented to person, place, and time. Mental status is at baseline.   Psychiatric:         Mood and Affect: Mood normal.         Behavior: Behavior normal.         Labs:   Brief Urine Lab Results  (Last result in the past 365 days)        Color   Clarity   Blood   Leuk Est   Nitrite   Protein   CREAT   Urine HCG        03/27/24 1039 Yellow   Clear   Negative   Small (1+)   Negative   Trace                   Urine Culture          12/7/2023    14:05 12/22/2023    15:24   Urine Culture   Urine Culture No growth  <10,000 CFU/mL Normal Urogenital Deidra         Lab Results   Component Value Date    GLUCOSE 125 (H) 12/04/2023    CALCIUM 9.8 12/04/2023     12/04/2023    K 4.8 12/04/2023    CO2 27.0 12/04/2023     12/04/2023    BUN 28 (H) 12/04/2023    CREATININE 1.53 (H) 12/04/2023    EGFRIFNONA 46 (L) 04/09/2019    BCR 18.3 12/04/2023    ANIONGAP 10.0 12/04/2023       Lab Results   Component Value Date    WBC 6.19 12/04/2023    HGB 13.9 12/04/2023    HCT 40.9 12/04/2023    MCV 89.7 12/04/2023     12/04/2023       Images:   No Images in the past 120 days found..    Measures:   Tobacco:   Elliot Jean Baptiste  reports that he has never smoked. He has been exposed to tobacco smoke. He has never used smokeless tobacco.     Assessment / Plan      Assessment/Plan:   74 y.o. male is doing great 3 months postop from an uncomplicated greenlight PVP in December 2023.  Continued significant improvement in storage and voiding symptoms.  IPSS 7.  PVR 0.  Urinalysis unremarkable today.  Very happy with his progress.  Will see him back in 6 months with a urinalysis and PVR.  If he is continue to do well, we will see him yearly thereafter.  Knows to call the office in the meantime with questions or concerns.  He is happy with the plan.    Diagnoses and all orders for this visit:    1.  Benign prostatic hyperplasia with urinary frequency (Primary)  -     POC Urinalysis Dipstick, Automated       Follow Up:   Return in about 6 months (around 9/27/2024) for Recheck, f/u with lisa.    I spent approximately 20 minutes providing clinical care for this patient; including review of patient's chart and provider documentation, face to face time spent with patient in examination room (obtaining history, performing physical exam, discussing diagnosis and management options), placing orders, and completing patient documentation.     Harrison Junior PA-C  [facility] [place] [facility] [place]

## 2024-10-01 ENCOUNTER — OFFICE VISIT (OUTPATIENT)
Dept: FAMILY MEDICINE CLINIC | Facility: CLINIC | Age: 74
End: 2024-10-01
Payer: MEDICARE

## 2024-10-01 ENCOUNTER — LAB (OUTPATIENT)
Dept: LAB | Facility: HOSPITAL | Age: 74
End: 2024-10-01
Payer: MEDICARE

## 2024-10-01 VITALS
HEART RATE: 76 BPM | DIASTOLIC BLOOD PRESSURE: 88 MMHG | BODY MASS INDEX: 27.86 KG/M2 | OXYGEN SATURATION: 97 % | SYSTOLIC BLOOD PRESSURE: 142 MMHG | WEIGHT: 151.4 LBS | HEIGHT: 62 IN

## 2024-10-01 DIAGNOSIS — Z00.00 MEDICARE ANNUAL WELLNESS VISIT, SUBSEQUENT: Primary | ICD-10-CM

## 2024-10-01 DIAGNOSIS — Z13.6 SCREENING FOR CARDIOVASCULAR CONDITION: ICD-10-CM

## 2024-10-01 DIAGNOSIS — E55.9 VITAMIN D DEFICIENCY: ICD-10-CM

## 2024-10-01 DIAGNOSIS — R73.03 PREDIABETES: ICD-10-CM

## 2024-10-01 DIAGNOSIS — R79.89 OTHER SPECIFIED ABNORMAL FINDINGS OF BLOOD CHEMISTRY: ICD-10-CM

## 2024-10-01 DIAGNOSIS — R31.9 HEMATURIA, UNSPECIFIED TYPE: ICD-10-CM

## 2024-10-01 DIAGNOSIS — E78.5 DYSLIPIDEMIA: ICD-10-CM

## 2024-10-01 DIAGNOSIS — Z12.5 ENCOUNTER FOR PROSTATE CANCER SCREENING: ICD-10-CM

## 2024-10-01 DIAGNOSIS — R35.0 BENIGN PROSTATIC HYPERPLASIA WITH URINARY FREQUENCY: ICD-10-CM

## 2024-10-01 DIAGNOSIS — N40.1 BENIGN PROSTATIC HYPERPLASIA WITH URINARY FREQUENCY: ICD-10-CM

## 2024-10-01 LAB
ALBUMIN SERPL-MCNC: 4.5 G/DL (ref 3.5–5.2)
ALBUMIN/GLOB SERPL: 1.5 G/DL
ALP SERPL-CCNC: 82 U/L (ref 39–117)
ALT SERPL W P-5'-P-CCNC: 18 U/L (ref 1–41)
ANION GAP SERPL CALCULATED.3IONS-SCNC: 12.5 MMOL/L (ref 5–15)
AST SERPL-CCNC: 23 U/L (ref 1–40)
BACTERIA UR QL AUTO: ABNORMAL /HPF
BILIRUB SERPL-MCNC: 2.3 MG/DL (ref 0–1.2)
BILIRUB UR QL STRIP: NEGATIVE
BUN SERPL-MCNC: 32 MG/DL (ref 8–23)
BUN/CREAT SERPL: 18 (ref 7–25)
CALCIUM SPEC-SCNC: 10.2 MG/DL (ref 8.6–10.5)
CHLORIDE SERPL-SCNC: 105 MMOL/L (ref 98–107)
CHOLEST SERPL-MCNC: 154 MG/DL (ref 0–200)
CLARITY UR: CLEAR
CO2 SERPL-SCNC: 23.5 MMOL/L (ref 22–29)
COLOR UR: YELLOW
CREAT SERPL-MCNC: 1.78 MG/DL (ref 0.76–1.27)
EGFRCR SERPLBLD CKD-EPI 2021: 39 ML/MIN/1.73
GLOBULIN UR ELPH-MCNC: 3 GM/DL
GLUCOSE SERPL-MCNC: 112 MG/DL (ref 65–99)
GLUCOSE UR STRIP-MCNC: NEGATIVE MG/DL
HBA1C MFR BLD: 5.9 % (ref 4.8–5.6)
HDLC SERPL-MCNC: 49 MG/DL (ref 40–60)
HGB UR QL STRIP.AUTO: NEGATIVE
HYALINE CASTS UR QL AUTO: ABNORMAL /LPF
KETONES UR QL STRIP: NEGATIVE
LDLC SERPL CALC-MCNC: 82 MG/DL (ref 0–100)
LDLC/HDLC SERPL: 1.61 {RATIO}
LEUKOCYTE ESTERASE UR QL STRIP.AUTO: ABNORMAL
NITRITE UR QL STRIP: NEGATIVE
PH UR STRIP.AUTO: 5.5 [PH] (ref 5–8)
POTASSIUM SERPL-SCNC: 4.8 MMOL/L (ref 3.5–5.2)
PROT SERPL-MCNC: 7.5 G/DL (ref 6–8.5)
PROT UR QL STRIP: NEGATIVE
PSA SERPL-MCNC: 12.4 NG/ML (ref 0–4)
RBC # UR STRIP: ABNORMAL /HPF
REF LAB TEST METHOD: ABNORMAL
SODIUM SERPL-SCNC: 141 MMOL/L (ref 136–145)
SP GR UR STRIP: 1.02 (ref 1–1.03)
SQUAMOUS #/AREA URNS HPF: ABNORMAL /HPF
T4 FREE SERPL-MCNC: 1.16 NG/DL (ref 0.92–1.68)
TRIGL SERPL-MCNC: 131 MG/DL (ref 0–150)
TSH SERPL DL<=0.05 MIU/L-ACNC: 2.54 UIU/ML (ref 0.27–4.2)
UROBILINOGEN UR QL STRIP: ABNORMAL
VLDLC SERPL-MCNC: 23 MG/DL (ref 5–40)
WBC # UR STRIP: ABNORMAL /HPF

## 2024-10-01 PROCEDURE — 83036 HEMOGLOBIN GLYCOSYLATED A1C: CPT | Performed by: INTERNAL MEDICINE

## 2024-10-01 PROCEDURE — 80061 LIPID PANEL: CPT | Performed by: INTERNAL MEDICINE

## 2024-10-01 PROCEDURE — 82306 VITAMIN D 25 HYDROXY: CPT | Performed by: INTERNAL MEDICINE

## 2024-10-01 PROCEDURE — 84439 ASSAY OF FREE THYROXINE: CPT | Performed by: INTERNAL MEDICINE

## 2024-10-01 PROCEDURE — G0103 PSA SCREENING: HCPCS | Performed by: INTERNAL MEDICINE

## 2024-10-01 PROCEDURE — 85025 COMPLETE CBC W/AUTO DIFF WBC: CPT | Performed by: INTERNAL MEDICINE

## 2024-10-01 PROCEDURE — 87086 URINE CULTURE/COLONY COUNT: CPT | Performed by: UROLOGY

## 2024-10-01 PROCEDURE — 84443 ASSAY THYROID STIM HORMONE: CPT | Performed by: INTERNAL MEDICINE

## 2024-10-01 PROCEDURE — 81001 URINALYSIS AUTO W/SCOPE: CPT | Performed by: UROLOGY

## 2024-10-01 PROCEDURE — 80053 COMPREHEN METABOLIC PANEL: CPT | Performed by: INTERNAL MEDICINE

## 2024-10-01 RX ORDER — CHLORHEXIDINE GLUCONATE ORAL RINSE 1.2 MG/ML
SOLUTION DENTAL
COMMUNITY
Start: 2024-07-08

## 2024-10-01 RX ORDER — FLUOROURACIL 50 MG/G
CREAM TOPICAL
COMMUNITY
Start: 2024-07-16

## 2024-10-02 ENCOUNTER — OFFICE VISIT (OUTPATIENT)
Dept: UROLOGY | Facility: CLINIC | Age: 74
End: 2024-10-02
Payer: MEDICARE

## 2024-10-02 DIAGNOSIS — N40.1 BENIGN PROSTATIC HYPERPLASIA WITH URINARY FREQUENCY: Primary | ICD-10-CM

## 2024-10-02 DIAGNOSIS — R97.20 ELEVATED PROSTATE SPECIFIC ANTIGEN (PSA): ICD-10-CM

## 2024-10-02 DIAGNOSIS — R35.0 BENIGN PROSTATIC HYPERPLASIA WITH URINARY FREQUENCY: Primary | ICD-10-CM

## 2024-10-02 DIAGNOSIS — N41.9 PROSTATITIS, UNSPECIFIED PROSTATITIS TYPE: ICD-10-CM

## 2024-10-02 LAB
25(OH)D3 SERPL-MCNC: 63 NG/ML (ref 30–100)
BASOPHILS # BLD AUTO: 0.05 10*3/MM3 (ref 0–0.2)
BASOPHILS NFR BLD AUTO: 0.9 % (ref 0–1.5)
BILIRUB BLD-MCNC: NEGATIVE MG/DL
CLARITY, POC: CLEAR
COLOR UR: YELLOW
DEPRECATED RDW RBC AUTO: 38.8 FL (ref 37–54)
EOSINOPHIL # BLD AUTO: 0.24 10*3/MM3 (ref 0–0.4)
EOSINOPHIL NFR BLD AUTO: 4.1 % (ref 0.3–6.2)
ERYTHROCYTE [DISTWIDTH] IN BLOOD BY AUTOMATED COUNT: 12.1 % (ref 12.3–15.4)
EXPIRATION DATE: NORMAL
GLUCOSE UR STRIP-MCNC: NEGATIVE MG/DL
HCT VFR BLD AUTO: 41.1 % (ref 37.5–51)
HGB BLD-MCNC: 14.1 G/DL (ref 13–17.7)
IMM GRANULOCYTES # BLD AUTO: 0.03 10*3/MM3 (ref 0–0.05)
IMM GRANULOCYTES NFR BLD AUTO: 0.5 % (ref 0–0.5)
KETONES UR QL: NEGATIVE
LEUKOCYTE EST, POC: NEGATIVE
LYMPHOCYTES # BLD AUTO: 2.07 10*3/MM3 (ref 0.7–3.1)
LYMPHOCYTES NFR BLD AUTO: 35.6 % (ref 19.6–45.3)
Lab: NORMAL
MCH RBC QN AUTO: 30.3 PG (ref 26.6–33)
MCHC RBC AUTO-ENTMCNC: 34.3 G/DL (ref 31.5–35.7)
MCV RBC AUTO: 88.2 FL (ref 79–97)
MONOCYTES # BLD AUTO: 0.43 10*3/MM3 (ref 0.1–0.9)
MONOCYTES NFR BLD AUTO: 7.4 % (ref 5–12)
NEUTROPHILS NFR BLD AUTO: 2.99 10*3/MM3 (ref 1.7–7)
NEUTROPHILS NFR BLD AUTO: 51.5 % (ref 42.7–76)
NITRITE UR-MCNC: NEGATIVE MG/ML
NRBC BLD AUTO-RTO: 0 /100 WBC (ref 0–0.2)
PH UR: 5.5 [PH] (ref 5–8)
PLATELET # BLD AUTO: 200 10*3/MM3 (ref 140–450)
PMV BLD AUTO: 10.9 FL (ref 6–12)
PROT UR STRIP-MCNC: NEGATIVE MG/DL
RBC # BLD AUTO: 4.66 10*6/MM3 (ref 4.14–5.8)
RBC # UR STRIP: NEGATIVE /UL
SP GR UR: 1.02 (ref 1–1.03)
UROBILINOGEN UR QL: NORMAL
WBC NRBC COR # BLD AUTO: 5.81 10*3/MM3 (ref 3.4–10.8)

## 2024-10-02 RX ORDER — DOXYCYCLINE 100 MG/1
100 CAPSULE ORAL 2 TIMES DAILY
Qty: 20 CAPSULE | Refills: 0 | Status: SHIPPED | OUTPATIENT
Start: 2024-10-02 | End: 2024-10-12

## 2024-10-02 RX ORDER — ACETAMINOPHEN 160 MG
2000 TABLET,DISINTEGRATING ORAL DAILY
COMMUNITY

## 2024-10-02 NOTE — PROGRESS NOTES
LUTS Male Office Visit      Patient Name: Elliot Jean Baptiste  : 1950   MRN: [id number]     Chief Complaint:  Lower Urinary Tract Symptoms.   Chief Complaint   Patient presents with    Benign prostatic hyperplasia with urinary frequency        History of Present Illness: Mr. Jean Baptiste is a 74 y.o. male with history of lower urinary tract symptoms.  He underwent a greenlight PVP in 2023.  He has done well postoperatively with significant improvement in his storage and voiding symptoms.  His IPSS was 7 and his 2024 visit and today it is 3.  He denies any hematuria, dysuria, incomplete emptying or any other voiding symptoms at this time.    His PCP ordered routine blood work yesterday that revealed a PSA of 12.  He has last PSA document in our system of 4 in 2019.  He has previously followed with outside urologist and had 3 prior prostate biopsies that were all negative for prostate cancer.  He has not undergone MRI of the prostate.  He does not report any dysuria or pelvic fullness/pain today.      Subjective      Review of System:   Review of Systems   Genitourinary:  Negative for difficulty urinating, dysuria, flank pain, frequency, hematuria and urgency.      I have reviewed the ROS documented by my clinical staff, I have updated appropriately and I agree. Rafael Ahamdi MD    Past Medical History:  Past Medical History:   Diagnosis Date    Allergic     Arthritis     Benign prostatic hyperplasia     Cancer     skin    Cataract     Colon polyp     Depression     Enlarged prostate     Gallstones     History of medical problems     poor blood clotting     HL (hearing loss)     Hypertension     Kidney infection     Kidney stone     Migraine     Prostatitis        Past Surgical History:  Past Surgical History:   Procedure Laterality Date    APPENDECTOMY      CATARACT EXTRACTION Bilateral     CHOLECYSTECTOMY      COLONOSCOPY      CYSTOSCOPY      CYSTOSCOPY TRANSURETHRAL RESECTION OF  PROSTATE N/A 12/07/2023    Procedure: CYSTOSCOPY TRANSURETHRAL RESECTION OF PROSTATE GREENLIGHT;  Surgeon: Cheri Dubon MD;  Location: [facility];  Service: Urology;  Laterality: N/A;    GALLBLADDER SURGERY      HEMORRHOIDECTOMY      KIDNEY STONE SURGERY      KNEE CARTILAGE SURGERY Left     LITHOTRIPSY  1993    PROSTATE SURGERY  December 7, 2023    ULNAR NERVE REPAIR Right        Medications:    Current Outpatient Medications:     chlorhexidine (PERIDEX) 0.12 % solution, SWISH WITH 1/2 OUNCE FOR 30 SECONDS AND SPIT , TWO TIMES A DAY FOLLOWING BRUSHING, Disp: , Rfl:     Cholecalciferol (Vitamin D3) 50 MCG (2000 UT) capsule, Take 1 capsule by mouth Daily., Disp: , Rfl:     Cholecalciferol (VITAMIN D3) 5000 units capsule capsule, Take 1 capsule by mouth Daily., Disp: , Rfl:     fluorouracil (EFUDEX) 5 % cream, APPLY A THIN LAYER TO SCALP, TEMPLES, SIDEBBURNS, AND FOREHEAD AT BEDTIME FOR 2 WEEKS. WASH OFF EACH MORNING., Disp: , Rfl:     pravastatin (PRAVACHOL) 40 MG tablet, Take 1 tablet by mouth Every Night., Disp: 90 tablet, Rfl: 3    tamsulosin (FLOMAX) 0.4 MG capsule 24 hr capsule, Take 1 capsule by mouth Daily., Disp: , Rfl:     doxycycline (VIBRAMYCIN) 100 MG capsule, Take 1 capsule by mouth 2 (Two) Times a Day for 10 days., Disp: 20 capsule, Rfl: 0    finasteride (PROSCAR) 5 MG tablet, Take 1 tablet by mouth Daily., Disp: , Rfl:     Allergies:  No Known Allergies    Social History:  Social History     Socioeconomic History    Marital status:    Tobacco Use    Smoking status: Never     Passive exposure: Past    Smokeless tobacco: Never   Vaping Use    Vaping status: Never Used   Substance and Sexual Activity    Alcohol use: Yes     Comment: social occasions    Drug use: No    Sexual activity: Not Currently     Partners: Female       Family History:  Family History   Problem Relation Age of Onset    Cancer Mother         1 kidney removed    Hypertension Mother     Migraines Mother     Osteoporosis Mother      Kidney cancer Mother         Kidney removed    Hypertension Father     Hyperlipidemia Father         Bypass surgeries, valve repair    Hearing loss Father         work related needed hearing aids    Heart disease Father     Kidney disease Father         loss of kidney function    Vision loss Father         Macular degeneration    Migraines Sister     Stroke Paternal Grandmother          of a stroke       IPSS Questionnaire (AUA-7):  Over the past month…    1)  Incomplete Emptying  How often have you had a sensation of not emptying your bladder?  1 - Less than 1 time in 5   2)  Frequency  How often have you had to urinate less than every two hours? 0 - Not at all   3)  Intermittency  How often have you found you stopped and started again several times when you urinated?  0 - Not at all   4) Urgency  How often have you found it difficult to postpone urination?  0 - Not at all   5) Weak Stream  How often have you had a weak urinary stream?  1 - Less than 1 time in 5   6) Straining  How often have you had to push or strain to begin urination?  0 - Not at all   7) Nocturia  How many times did you typically get up at night to urinate?  1 - 1 time   Total Score:  3   The International Prostate Symptom Score (IPSS) is used to screen, diagnose, track symptoms of benign prostatic hyperplasia (BPH).    0-7 pts (Mild Symptoms)  / 8-19 pts (Moderate) / 20-35 (Severe)    Quality of life due to urinary symptoms:  If you were to spend the rest of your life with your urinary condition the way it is now, how would you feel about that? 0-Delighted   Urine Leakage (Incontinence) 0-No Leakage       Post void residual bladder scan:   0 mL     Objective     Physical Exam:   Vital Signs: There were no vitals filed for this visit.  There is no height or weight on file to calculate BMI.     Physical Exam  Vitals and nursing note reviewed.   Constitutional:       General: He is not in acute distress.     Appearance: Normal appearance.    HENT:      Head: Normocephalic and atraumatic.      Right Ear: External ear normal.      Left Ear: External ear normal.      Nose: Nose normal.   Eyes:      Conjunctiva/sclera: Conjunctivae normal.   Pulmonary:      Effort: Pulmonary effort is normal. No respiratory distress.   Abdominal:      Palpations: Abdomen is soft.      Tenderness: There is no abdominal tenderness.   Musculoskeletal:         General: No deformity. Normal range of motion.      Cervical back: Normal range of motion.   Skin:     General: Skin is warm.   Neurological:      General: No focal deficit present.      Mental Status: He is alert and oriented to person, place, and time. Mental status is at baseline.   Psychiatric:         Mood and Affect: Mood normal.         Behavior: Behavior normal.         Thought Content: Thought content normal.         Judgment: Judgment normal.         Labs:   Lab Results   Component Value Date    PSA 12.400 (H) 10/01/2024    PSA 4.250 (H) 04/09/2019       Brief Urine Lab Results  (Last result in the past 365 days)        Color   Clarity   Blood   Leuk Est   Nitrite   Protein   CREAT   Urine HCG        10/02/24 1011 Yellow   Clear   Negative   Negative   Negative   Negative                   Urine Culture          12/7/2023    14:05 12/22/2023    15:24 10/1/2024    13:10   Urine Culture   Urine Culture No growth  <10,000 CFU/mL Normal Urogenital Deidra  No growth  P      Details         P Preliminary result                Lab Results   Component Value Date    GLUCOSE 112 (H) 10/01/2024    CALCIUM 10.2 10/01/2024     10/01/2024    K 4.8 10/01/2024    CO2 23.5 10/01/2024     10/01/2024    BUN 32 (H) 10/01/2024    CREATININE 1.78 (H) 10/01/2024    EGFRIFNONA 46 (L) 04/09/2019    BCR 18.0 10/01/2024    ANIONGAP 12.5 10/01/2024       Lab Results   Component Value Date    WBC 5.81 10/01/2024    HGB 14.1 10/01/2024    HCT 41.1 10/01/2024    MCV 88.2 10/01/2024     10/01/2024       Images:   No Images  in the past 120 days found..    Measures:   Tobacco:   Elliot Jean Baptiste  reports that he has never smoked. He has been exposed to tobacco smoke. He has never used smokeless tobacco.       Urine Incontinence: Patient reports that he is not currently experiencing any symptoms of urinary incontinence.      Assessment / Plan      Assessment:  Mr. Jean Baptiste is a 74 y.o. male who presented today with lower urinary tract symptoms He underwent a greenlight in summer 2023 and has had significant symptom improvement since then.  His IPSS is 3 today and PVR 0 mL.  Of note he did have routine blood work yesterday with his PCP that revealed a PSA of 12.4 we discussed these results with him and recommended a course of antibiotics and repeat PSA.  We did reassure him that with 3 prior negative biopsies that this is likely related to prostatitis but given the degree of elevation we will recommend additional workup.  We will start him on doxycycline for presumed prostatitis given his PSA, however, he does not have any symptoms at this time.   If his PSA remains significantly elevated on recheck then we will undergo an MRI of the prostate to assess for any concerning lesions.    Diagnoses and all orders for this visit:    1. Benign prostatic hyperplasia with urinary frequency (Primary)  -     POC Urinalysis Dipstick, Automated  -     doxycycline (VIBRAMYCIN) 100 MG capsule; Take 1 capsule by mouth 2 (Two) Times a Day for 10 days.  Dispense: 20 capsule; Refill: 0    2. Elevated prostate specific antigen (PSA)  -     PSA Diagnostic; Future    3. Prostatitis, unspecified prostatitis type        Follow Up:   - RTC in 3 weeks approximately for repeat PSA after course of abx    I spent approximately 30 minutes providing clinical care for this patient; including review of patient's chart and provider documentation, face to face time spent with patient in examination room (obtaining history, performing physical exam, discussing diagnosis and  management options), placing orders, and completing patient documentation.     Cheri Dubon MD  [facility] [place]

## 2024-10-02 NOTE — PROGRESS NOTES
The ABCs of the Annual Wellness Visit  Subsequent Medicare Wellness Visit    Subjective      Elliot Jean Baptiste is a 74 y.o. male who presents for a Subsequent Medicare Wellness Visit.    The following portions of the patient's history were reviewed and   updated as appropriate: allergies, current medications, past family history, past medical history, past social history, past surgical history, and problem list.    Compared to one year ago, the patient feels his physical   health is the same.    Compared to one year ago, the patient feels his mental   health is the same.    Recent Hospitalizations:  He was not admitted to the hospital during the last year.       Current Medical Providers:  Patient Care Team:  Dandy Teran,  as PCP - General (Internal Medicine)    Outpatient Medications Prior to Visit   Medication Sig Dispense Refill    chlorhexidine (PERIDEX) 0.12 % solution SWISH WITH 1/2 OUNCE FOR 30 SECONDS AND SPIT , TWO TIMES A DAY FOLLOWING BRUSHING      Cholecalciferol (VITAMIN D3) 5000 units capsule capsule Take 1 capsule by mouth Daily.      fluorouracil (EFUDEX) 5 % cream APPLY A THIN LAYER TO SCALP, TEMPLES, SIDEBBURNS, AND FOREHEAD AT BEDTIME FOR 2 WEEKS. WASH OFF EACH MORNING.      pravastatin (PRAVACHOL) 40 MG tablet Take 1 tablet by mouth Every Night. 90 tablet 3    tamsulosin (FLOMAX) 0.4 MG capsule 24 hr capsule Take 1 capsule by mouth Daily.      finasteride (PROSCAR) 5 MG tablet Take 1 tablet by mouth Daily.       No facility-administered medications prior to visit.       No opioid medication identified on active medication list. I have reviewed chart for other potential  high risk medication/s and harmful drug interactions in the elderly.        Aspirin is not on active medication list.  Aspirin use is not indicated based on review of current medical condition/s. Risk of harm outweighs potential benefits.  .    Patient Active Problem List   Diagnosis    Benign prostatic hyperplasia with  "urinary frequency    Vitamin D deficiency    Primary insomnia    History of hypertension    Benign localized prostatic hyperplasia with lower urinary tract symptoms (LUTS)     Advance Care Planning   Advance Care Planning     Advance Directive is not on file.  ACP discussion was held with the patient during this visit. Patient does not have an advance directive, information provided.     Objective    Vitals:    10/01/24 1219   BP: 142/88   Pulse: 76   SpO2: 97%   Weight: 68.7 kg (151 lb 6.4 oz)   Height: 157.5 cm (62.01\")   PainSc: 0-No pain     Estimated body mass index is 27.68 kg/m² as calculated from the following:    Height as of this encounter: 157.5 cm (62.01\").    Weight as of this encounter: 68.7 kg (151 lb 6.4 oz).           Does the patient have evidence of cognitive impairment?   No    Lab Results   Component Value Date    TRIG 131 10/01/2024    HDL 49 10/01/2024    LDL 82 10/01/2024    VLDL 23 10/01/2024    HGBA1C 5.90 (H) 10/01/2024          HEALTH RISK ASSESSMENT    Smoking Status:  Social History     Tobacco Use   Smoking Status Never    Passive exposure: Past   Smokeless Tobacco Never     Alcohol Consumption:  Social History     Substance and Sexual Activity   Alcohol Use Yes    Comment: social occasions     Fall Risk Screen:    NILSON Fall Risk Assessment was completed, and patient is at LOW risk for falls.Assessment completed on:10/1/2024    Depression Screening:      10/1/2024    12:20 PM   PHQ-2/PHQ-9 Depression Screening   Little Interest or Pleasure in Doing Things 0-->not at all   Feeling Down, Depressed or Hopeless 0-->not at all   PHQ-9: Brief Depression Severity Measure Score 0       Health Habits and Functional and Cognitive Screenin/24/2024    10:19 AM   Functional & Cognitive Status   Do you have difficulty preparing food and eating? No   Do you have difficulty bathing yourself, getting dressed or grooming yourself? No   Do you have difficulty using the toilet? No   Do you have " difficulty moving around from place to place? No   Do you have trouble with steps or getting out of a bed or a chair? No   Current Diet Well Balanced Diet   Dental Exam Up to date   Eye Exam Up to date   Exercise (times per week) 3 times per week   Current Exercises Include Aerobics;Cardiovascular Workout;Swim Aerobics Class;Swimming;Walking;Yard Work   Do you need help using the phone?  No   Are you deaf or do you have serious difficulty hearing?  No   Do you need help to go to places out of walking distance? No   Do you need help shopping? No   Do you need help preparing meals?  No   Do you need help with housework?  No   Do you need help with laundry? No   Do you need help taking your medications? No   Do you need help managing money? No   Do you ever drive or ride in a car without wearing a seat belt? No   Have you felt unusual stress, anger or loneliness in the last month? No   Who do you live with? Spouse   If you need help, do you have trouble finding someone available to you? No   Have you been bothered in the last four weeks by sexual problems? No   Do you have difficulty concentrating, remembering or making decisions? No       Age-appropriate Screening Schedule:  Refer to the list below for future screening recommendations based on patient's age, sex and/or medical conditions. Orders for these recommended tests are listed in the plan section. The patient has been provided with a written plan.    Health Maintenance   Topic Date Due    RSV Vaccine - Adults (1 - 1-dose 60+ series) Never done    ZOSTER VACCINE (2 of 3) 06/26/2014    HEPATITIS C SCREENING  Never done    COVID-19 Vaccine (2 - 2023-24 season) 09/01/2024    Pneumococcal Vaccine 65+ (2 of 2 - PPSV23 or PCV20) 10/11/2024 (Originally 11/4/2015)    TDAP/TD VACCINES (1 - Tdap) 10/11/2024 (Originally 2/16/1969)    BMI FOLLOWUP  10/11/2024    ANNUAL WELLNESS VISIT  10/01/2025    LIPID PANEL  10/01/2025    INFLUENZA VACCINE  Completed    COLORECTAL CANCER  SCREENING  Discontinued                Physical Exam  Gen Appearance: NAD  HEENT: Normocephalic, PERRLA, no thyromegaly, trache midline  Heart: RRR, normal S1 and S2, no murmur  Lungs: CTA b/l, no wheezing, no crackles  Abdomen: Soft, non-tender, non-distended, no guarding and BSx4  MSK: Moves all extremities well, normal gait, no peripheral edema  Pulses: Palpable and equal b/l  Lymph nodes: No palpable lymphadenopathy   Neuro: No focal deficits     CMS Preventative Services Quick Reference  Risk Factors Identified During Encounter:    None Identified    The above risks/problems have been discussed with the patient.  Pertinent information has been shared with the patient in the After Visit Summary.    Diagnoses and all orders for this visit:    1. Medicare annual wellness visit, subsequent (Primary)  Counseled on healthy weight, nutrition, physical activity, cancer screening, and immunizations.    2. Encounter for prostate cancer screening  -     Comprehensive Metabolic Panel; Future  -     CBC & Differential; Future  -     Hemoglobin A1c; Future  -     Lipid Panel; Future  -     TSH+Free T4; Future  -     Urinalysis With Culture If Indicated - Urine, Clean Catch; Future  -     Vitamin D,25-Hydroxy; Future  -     PSA Screen; Future  -     Comprehensive Metabolic Panel  -     CBC & Differential  -     Hemoglobin A1c  -     Lipid Panel  -     TSH+Free T4  -     Urinalysis With Culture If Indicated - Urine, Clean Catch  -     Vitamin D,25-Hydroxy  -     PSA Screen  -     Cancel: [place] Urine Culture Tube - Urine, Clean Catch  -     Urinalysis, Microscopic Only - Urine, Clean Catch    3. Screening for cardiovascular condition  -     Comprehensive Metabolic Panel; Future  -     CBC & Differential; Future  -     Hemoglobin A1c; Future  -     Lipid Panel; Future  -     TSH+Free T4; Future  -     Urinalysis With Culture If Indicated - Urine, Clean Catch; Future  -     Vitamin D,25-Hydroxy; Future  -     PSA Screen;  Future  -     Comprehensive Metabolic Panel  -     CBC & Differential  -     Hemoglobin A1c  -     Lipid Panel  -     TSH+Free T4  -     Urinalysis With Culture If Indicated - Urine, Clean Catch  -     Vitamin D,25-Hydroxy  -     PSA Screen  -     Cancel: [place] Urine Culture Tube - Urine, Clean Catch  -     Urinalysis, Microscopic Only - Urine, Clean Catch    4. Vitamin D deficiency  -     Comprehensive Metabolic Panel; Future  -     CBC & Differential; Future  -     Hemoglobin A1c; Future  -     Lipid Panel; Future  -     TSH+Free T4; Future  -     Urinalysis With Culture If Indicated - Urine, Clean Catch; Future  -     Vitamin D,25-Hydroxy; Future  -     PSA Screen; Future  -     Comprehensive Metabolic Panel  -     CBC & Differential  -     Hemoglobin A1c  -     Lipid Panel  -     TSH+Free T4  -     Urinalysis With Culture If Indicated - Urine, Clean Catch  -     Vitamin D,25-Hydroxy  -     PSA Screen  -     Cancel: [place] Urine Culture Tube - Urine, Clean Catch  -     Urinalysis, Microscopic Only - Urine, Clean Catch    5. Dyslipidemia  -     Comprehensive Metabolic Panel; Future  -     CBC & Differential; Future  -     Hemoglobin A1c; Future  -     Lipid Panel; Future  -     TSH+Free T4; Future  -     Urinalysis With Culture If Indicated - Urine, Clean Catch; Future  -     Vitamin D,25-Hydroxy; Future  -     PSA Screen; Future  -     Comprehensive Metabolic Panel  -     CBC & Differential  -     Hemoglobin A1c  -     Lipid Panel  -     TSH+Free T4  -     Urinalysis With Culture If Indicated - Urine, Clean Catch  -     Vitamin D,25-Hydroxy  -     PSA Screen  -     Cancel: [place] Urine Culture Tube - Urine, Clean Catch  -     Urinalysis, Microscopic Only - Urine, Clean Catch    6. Other specified abnormal findings of blood chemistry  -     Hemoglobin A1c; Future  -     Hemoglobin A1c    7. Prediabetes    8. Benign prostatic hyperplasia with urinary frequency  -     Urine Culture - Urine, Urine, Clean  Catch    9. Hematuria, unspecified type  -     Urine Culture - Urine, Urine, Clean Catch    Other orders  -     Fluzone High-Dose 65+yrs        Follow Up:   Next Medicare Wellness visit to be scheduled in 1 year.      An After Visit Summary and PPPS were made available to the patient.

## 2024-10-03 ENCOUNTER — TELEPHONE (OUTPATIENT)
Dept: UROLOGY | Facility: CLINIC | Age: 74
End: 2024-10-03

## 2024-10-03 LAB — BACTERIA SPEC AEROBE CULT: NO GROWTH

## 2024-10-03 NOTE — TELEPHONE ENCOUNTER
Hub staff attempted to follow warm transfer process and was unsuccessful     Caller: Elliot Jean Baptiste    Relationship to patient: Self    Best call back number: [phone]    Chief complaint: PT STATED HE WOKE UP THIS MORNING WITH SEVERE ABD AND LOWER BACK PAIN AND THINKS HE IS PASSING A KIDNEY STONE.     Patient directed to call 911 or go to their nearest emergency room.     Patient verbalized understanding: [x] Yes  [] No  If no, why?    Additional notes:  PT STATED HE WILL GO TO [facility] ED TODAY.

## 2024-10-04 ENCOUNTER — HOSPITAL ENCOUNTER (INPATIENT)
Facility: HOSPITAL | Age: 74
LOS: 1 days | Discharge: HOME | End: 2024-10-06
Attending: EMERGENCY MEDICINE | Admitting: INTERNAL MEDICINE
Payer: MEDICARE

## 2024-10-04 ENCOUNTER — APPOINTMENT (OUTPATIENT)
Dept: CT IMAGING | Facility: HOSPITAL | Age: 74
End: 2024-10-04
Payer: MEDICARE

## 2024-10-04 DIAGNOSIS — R10.9 RIGHT FLANK PAIN: ICD-10-CM

## 2024-10-04 DIAGNOSIS — I26.99 BILATERAL PULMONARY EMBOLISM: Primary | ICD-10-CM

## 2024-10-04 LAB
ALBUMIN SERPL-MCNC: 4.2 G/DL (ref 3.5–5.2)
ALBUMIN/GLOB SERPL: 1.2 G/DL
ALP SERPL-CCNC: 87 U/L (ref 39–117)
ALT SERPL W P-5'-P-CCNC: 20 U/L (ref 1–41)
ANION GAP SERPL CALCULATED.3IONS-SCNC: 11 MMOL/L (ref 5–15)
ANION GAP SERPL CALCULATED.3IONS-SCNC: 11 MMOL/L (ref 5–15)
APTT PPP: 36.3 SECONDS (ref 60–90)
AST SERPL-CCNC: 23 U/L (ref 1–40)
BASOPHILS # BLD AUTO: 0.03 10*3/MM3 (ref 0–0.2)
BASOPHILS # BLD AUTO: 0.04 10*3/MM3 (ref 0–0.2)
BASOPHILS NFR BLD AUTO: 0.4 % (ref 0–1.5)
BASOPHILS NFR BLD AUTO: 0.4 % (ref 0–1.5)
BILIRUB SERPL-MCNC: 3.6 MG/DL (ref 0–1.2)
BILIRUB UR QL STRIP: NEGATIVE
BUN SERPL-MCNC: 25 MG/DL (ref 8–23)
BUN SERPL-MCNC: 25 MG/DL (ref 8–23)
BUN/CREAT SERPL: 14 (ref 7–25)
BUN/CREAT SERPL: 15.9 (ref 7–25)
CALCIUM SPEC-SCNC: 9.5 MG/DL (ref 8.6–10.5)
CALCIUM SPEC-SCNC: 9.5 MG/DL (ref 8.6–10.5)
CHLORIDE SERPL-SCNC: 102 MMOL/L (ref 98–107)
CHLORIDE SERPL-SCNC: 102 MMOL/L (ref 98–107)
CLARITY UR: CLEAR
CO2 SERPL-SCNC: 23 MMOL/L (ref 22–29)
CO2 SERPL-SCNC: 23 MMOL/L (ref 22–29)
COLOR UR: YELLOW
CREAT SERPL-MCNC: 1.57 MG/DL (ref 0.76–1.27)
CREAT SERPL-MCNC: 1.79 MG/DL (ref 0.76–1.27)
D DIMER PPP FEU-MCNC: 2.68 MCGFEU/ML (ref 0–0.76)
D-LACTATE SERPL-SCNC: 1 MMOL/L (ref 0.5–2)
DEPRECATED RDW RBC AUTO: 39.2 FL (ref 37–54)
DEPRECATED RDW RBC AUTO: 39.4 FL (ref 37–54)
EGFRCR SERPLBLD CKD-EPI 2021: 38.8 ML/MIN/1.73
EGFRCR SERPLBLD CKD-EPI 2021: 45.4 ML/MIN/1.73
EOSINOPHIL # BLD AUTO: 0.06 10*3/MM3 (ref 0–0.4)
EOSINOPHIL # BLD AUTO: 0.16 10*3/MM3 (ref 0–0.4)
EOSINOPHIL NFR BLD AUTO: 0.7 % (ref 0.3–6.2)
EOSINOPHIL NFR BLD AUTO: 2.2 % (ref 0.3–6.2)
ERYTHROCYTE [DISTWIDTH] IN BLOOD BY AUTOMATED COUNT: 12.1 % (ref 12.3–15.4)
ERYTHROCYTE [DISTWIDTH] IN BLOOD BY AUTOMATED COUNT: 12.3 % (ref 12.3–15.4)
GEN 5 2HR TROPONIN T REFLEX: 15 NG/L
GLOBULIN UR ELPH-MCNC: 3.4 GM/DL
GLUCOSE SERPL-MCNC: 119 MG/DL (ref 65–99)
GLUCOSE SERPL-MCNC: 125 MG/DL (ref 65–99)
GLUCOSE UR STRIP-MCNC: NEGATIVE MG/DL
HCT VFR BLD AUTO: 35.6 % (ref 37.5–51)
HCT VFR BLD AUTO: 37.5 % (ref 37.5–51)
HGB BLD-MCNC: 12.2 G/DL (ref 13–17.7)
HGB BLD-MCNC: 13 G/DL (ref 13–17.7)
HGB UR QL STRIP.AUTO: NEGATIVE
HOLD SPECIMEN: NORMAL
IMM GRANULOCYTES # BLD AUTO: 0.03 10*3/MM3 (ref 0–0.05)
IMM GRANULOCYTES # BLD AUTO: 0.09 10*3/MM3 (ref 0–0.05)
IMM GRANULOCYTES NFR BLD AUTO: 0.4 % (ref 0–0.5)
IMM GRANULOCYTES NFR BLD AUTO: 1 % (ref 0–0.5)
INR PPP: 1.14 (ref 0.89–1.12)
KETONES UR QL STRIP: ABNORMAL
LEUKOCYTE ESTERASE UR QL STRIP.AUTO: NEGATIVE
LIPASE SERPL-CCNC: 21 U/L (ref 13–60)
LYMPHOCYTES # BLD AUTO: 0.73 10*3/MM3 (ref 0.7–3.1)
LYMPHOCYTES # BLD AUTO: 1.79 10*3/MM3 (ref 0.7–3.1)
LYMPHOCYTES NFR BLD AUTO: 24.4 % (ref 19.6–45.3)
LYMPHOCYTES NFR BLD AUTO: 8 % (ref 19.6–45.3)
MCH RBC QN AUTO: 30.1 PG (ref 26.6–33)
MCH RBC QN AUTO: 30.4 PG (ref 26.6–33)
MCHC RBC AUTO-ENTMCNC: 34.3 G/DL (ref 31.5–35.7)
MCHC RBC AUTO-ENTMCNC: 34.7 G/DL (ref 31.5–35.7)
MCV RBC AUTO: 87.6 FL (ref 79–97)
MCV RBC AUTO: 87.9 FL (ref 79–97)
MONOCYTES # BLD AUTO: 0.69 10*3/MM3 (ref 0.1–0.9)
MONOCYTES # BLD AUTO: 0.71 10*3/MM3 (ref 0.1–0.9)
MONOCYTES NFR BLD AUTO: 7.5 % (ref 5–12)
MONOCYTES NFR BLD AUTO: 9.7 % (ref 5–12)
NEUTROPHILS NFR BLD AUTO: 4.61 10*3/MM3 (ref 1.7–7)
NEUTROPHILS NFR BLD AUTO: 62.9 % (ref 42.7–76)
NEUTROPHILS NFR BLD AUTO: 7.53 10*3/MM3 (ref 1.7–7)
NEUTROPHILS NFR BLD AUTO: 82.4 % (ref 42.7–76)
NITRITE UR QL STRIP: NEGATIVE
NRBC BLD AUTO-RTO: 0 /100 WBC (ref 0–0.2)
NRBC BLD AUTO-RTO: 0 /100 WBC (ref 0–0.2)
NT-PROBNP SERPL-MCNC: 304.5 PG/ML (ref 0–1800)
PH UR STRIP.AUTO: <=5 [PH] (ref 5–8)
PLATELET # BLD AUTO: 158 10*3/MM3 (ref 140–450)
PLATELET # BLD AUTO: 158 10*3/MM3 (ref 140–450)
PMV BLD AUTO: 10 FL (ref 6–12)
PMV BLD AUTO: 10.5 FL (ref 6–12)
POTASSIUM SERPL-SCNC: 4.5 MMOL/L (ref 3.5–5.2)
POTASSIUM SERPL-SCNC: 4.7 MMOL/L (ref 3.5–5.2)
PROT SERPL-MCNC: 7.6 G/DL (ref 6–8.5)
PROT UR QL STRIP: ABNORMAL
PROTHROMBIN TIME: 14.8 SECONDS (ref 12.2–14.5)
RBC # BLD AUTO: 4.05 10*6/MM3 (ref 4.14–5.8)
RBC # BLD AUTO: 4.28 10*6/MM3 (ref 4.14–5.8)
SODIUM SERPL-SCNC: 136 MMOL/L (ref 136–145)
SODIUM SERPL-SCNC: 136 MMOL/L (ref 136–145)
SP GR UR STRIP: 1.02 (ref 1–1.03)
TROPONIN T DELTA: -1 NG/L
TROPONIN T SERPL HS-MCNC: 16 NG/L
UFH PPP CHRO-ACNC: 0.1 IU/ML (ref 0.3–0.7)
UROBILINOGEN UR QL STRIP: ABNORMAL
WBC NRBC COR # BLD AUTO: 7.33 10*3/MM3 (ref 3.4–10.8)
WBC NRBC COR # BLD AUTO: 9.14 10*3/MM3 (ref 3.4–10.8)
WHOLE BLOOD HOLD COAG: NORMAL
WHOLE BLOOD HOLD SPECIMEN: NORMAL

## 2024-10-04 PROCEDURE — 85300 ANTITHROMBIN III ACTIVITY: CPT | Performed by: STUDENT IN AN ORGANIZED HEALTH CARE EDUCATION/TRAINING PROGRAM

## 2024-10-04 PROCEDURE — 81241 F5 GENE: CPT | Performed by: STUDENT IN AN ORGANIZED HEALTH CARE EDUCATION/TRAINING PROGRAM

## 2024-10-04 PROCEDURE — 85610 PROTHROMBIN TIME: CPT | Performed by: STUDENT IN AN ORGANIZED HEALTH CARE EDUCATION/TRAINING PROGRAM

## 2024-10-04 PROCEDURE — 83690 ASSAY OF LIPASE: CPT | Performed by: EMERGENCY MEDICINE

## 2024-10-04 PROCEDURE — 74176 CT ABD & PELVIS W/O CONTRAST: CPT

## 2024-10-04 PROCEDURE — 25510000001 IOPAMIDOL PER 1 ML: Performed by: EMERGENCY MEDICINE

## 2024-10-04 PROCEDURE — 99222 1ST HOSP IP/OBS MODERATE 55: CPT | Performed by: STUDENT IN AN ORGANIZED HEALTH CARE EDUCATION/TRAINING PROGRAM

## 2024-10-04 PROCEDURE — 85705 THROMBOPLASTIN INHIBITION: CPT | Performed by: STUDENT IN AN ORGANIZED HEALTH CARE EDUCATION/TRAINING PROGRAM

## 2024-10-04 PROCEDURE — 25010000002 MORPHINE PER 10 MG: Performed by: STUDENT IN AN ORGANIZED HEALTH CARE EDUCATION/TRAINING PROGRAM

## 2024-10-04 PROCEDURE — 85303 CLOT INHIBIT PROT C ACTIVITY: CPT | Performed by: STUDENT IN AN ORGANIZED HEALTH CARE EDUCATION/TRAINING PROGRAM

## 2024-10-04 PROCEDURE — 85306 CLOT INHIBIT PROT S FREE: CPT | Performed by: STUDENT IN AN ORGANIZED HEALTH CARE EDUCATION/TRAINING PROGRAM

## 2024-10-04 PROCEDURE — 86147 CARDIOLIPIN ANTIBODY EA IG: CPT | Performed by: STUDENT IN AN ORGANIZED HEALTH CARE EDUCATION/TRAINING PROGRAM

## 2024-10-04 PROCEDURE — 85670 THROMBIN TIME PLASMA: CPT | Performed by: STUDENT IN AN ORGANIZED HEALTH CARE EDUCATION/TRAINING PROGRAM

## 2024-10-04 PROCEDURE — 85520 HEPARIN ASSAY: CPT | Performed by: STUDENT IN AN ORGANIZED HEALTH CARE EDUCATION/TRAINING PROGRAM

## 2024-10-04 PROCEDURE — 85613 RUSSELL VIPER VENOM DILUTED: CPT | Performed by: STUDENT IN AN ORGANIZED HEALTH CARE EDUCATION/TRAINING PROGRAM

## 2024-10-04 PROCEDURE — 25010000002 HEPARIN (PORCINE) PER 1000 UNITS: Performed by: STUDENT IN AN ORGANIZED HEALTH CARE EDUCATION/TRAINING PROGRAM

## 2024-10-04 PROCEDURE — 99285 EMERGENCY DEPT VISIT HI MDM: CPT

## 2024-10-04 PROCEDURE — G0378 HOSPITAL OBSERVATION PER HR: HCPCS

## 2024-10-04 PROCEDURE — 85379 FIBRIN DEGRADATION QUANT: CPT | Performed by: EMERGENCY MEDICINE

## 2024-10-04 PROCEDURE — 83090 ASSAY OF HOMOCYSTEINE: CPT | Performed by: STUDENT IN AN ORGANIZED HEALTH CARE EDUCATION/TRAINING PROGRAM

## 2024-10-04 PROCEDURE — 86146 BETA-2 GLYCOPROTEIN ANTIBODY: CPT | Performed by: STUDENT IN AN ORGANIZED HEALTH CARE EDUCATION/TRAINING PROGRAM

## 2024-10-04 PROCEDURE — 25010000002 HEPARIN (PORCINE) 25000-0.45 UT/250ML-% SOLUTION: Performed by: STUDENT IN AN ORGANIZED HEALTH CARE EDUCATION/TRAINING PROGRAM

## 2024-10-04 PROCEDURE — 25010000002 HYDROMORPHONE PER 4 MG: Performed by: EMERGENCY MEDICINE

## 2024-10-04 PROCEDURE — 85730 THROMBOPLASTIN TIME PARTIAL: CPT | Performed by: STUDENT IN AN ORGANIZED HEALTH CARE EDUCATION/TRAINING PROGRAM

## 2024-10-04 PROCEDURE — 80053 COMPREHEN METABOLIC PANEL: CPT | Performed by: EMERGENCY MEDICINE

## 2024-10-04 PROCEDURE — 85025 COMPLETE CBC W/AUTO DIFF WBC: CPT | Performed by: STUDENT IN AN ORGANIZED HEALTH CARE EDUCATION/TRAINING PROGRAM

## 2024-10-04 PROCEDURE — 83880 ASSAY OF NATRIURETIC PEPTIDE: CPT | Performed by: STUDENT IN AN ORGANIZED HEALTH CARE EDUCATION/TRAINING PROGRAM

## 2024-10-04 PROCEDURE — 85732 THROMBOPLASTIN TIME PARTIAL: CPT | Performed by: STUDENT IN AN ORGANIZED HEALTH CARE EDUCATION/TRAINING PROGRAM

## 2024-10-04 PROCEDURE — 85025 COMPLETE CBC W/AUTO DIFF WBC: CPT | Performed by: EMERGENCY MEDICINE

## 2024-10-04 PROCEDURE — 36415 COLL VENOUS BLD VENIPUNCTURE: CPT

## 2024-10-04 PROCEDURE — 83605 ASSAY OF LACTIC ACID: CPT | Performed by: EMERGENCY MEDICINE

## 2024-10-04 PROCEDURE — 71275 CT ANGIOGRAPHY CHEST: CPT

## 2024-10-04 PROCEDURE — 25010000002 ONDANSETRON PER 1 MG: Performed by: EMERGENCY MEDICINE

## 2024-10-04 PROCEDURE — 84484 ASSAY OF TROPONIN QUANT: CPT | Performed by: STUDENT IN AN ORGANIZED HEALTH CARE EDUCATION/TRAINING PROGRAM

## 2024-10-04 PROCEDURE — 81003 URINALYSIS AUTO W/O SCOPE: CPT | Performed by: EMERGENCY MEDICINE

## 2024-10-04 RX ORDER — MORPHINE SULFATE 2 MG/ML
2 INJECTION, SOLUTION INTRAMUSCULAR; INTRAVENOUS EVERY 4 HOURS PRN
Status: DISCONTINUED | OUTPATIENT
Start: 2024-10-04 | End: 2024-10-06 | Stop reason: HOSPADM

## 2024-10-04 RX ORDER — SODIUM CHLORIDE 0.9 % (FLUSH) 0.9 %
10 SYRINGE (ML) INJECTION EVERY 12 HOURS SCHEDULED
Status: DISCONTINUED | OUTPATIENT
Start: 2024-10-04 | End: 2024-10-06 | Stop reason: HOSPADM

## 2024-10-04 RX ORDER — HEPARIN SODIUM 1000 [USP'U]/ML
80 INJECTION, SOLUTION INTRAVENOUS; SUBCUTANEOUS ONCE
Status: COMPLETED | OUTPATIENT
Start: 2024-10-04 | End: 2024-10-04

## 2024-10-04 RX ORDER — NITROGLYCERIN 0.4 MG/1
0.4 TABLET SUBLINGUAL
Status: DISCONTINUED | OUTPATIENT
Start: 2024-10-04 | End: 2024-10-06 | Stop reason: HOSPADM

## 2024-10-04 RX ORDER — SODIUM CHLORIDE 9 MG/ML
10 INJECTION, SOLUTION INTRAMUSCULAR; INTRAVENOUS; SUBCUTANEOUS AS NEEDED
Status: DISCONTINUED | OUTPATIENT
Start: 2024-10-04 | End: 2024-10-06 | Stop reason: HOSPADM

## 2024-10-04 RX ORDER — HEPARIN SODIUM 10000 [USP'U]/100ML
17 INJECTION, SOLUTION INTRAVENOUS
Status: DISCONTINUED | OUTPATIENT
Start: 2024-10-04 | End: 2024-10-06

## 2024-10-04 RX ORDER — BISACODYL 5 MG/1
5 TABLET, DELAYED RELEASE ORAL DAILY PRN
Status: DISCONTINUED | OUTPATIENT
Start: 2024-10-04 | End: 2024-10-06 | Stop reason: HOSPADM

## 2024-10-04 RX ORDER — SODIUM CHLORIDE 0.9 % (FLUSH) 0.9 %
10 SYRINGE (ML) INJECTION AS NEEDED
Status: DISCONTINUED | OUTPATIENT
Start: 2024-10-04 | End: 2024-10-06 | Stop reason: HOSPADM

## 2024-10-04 RX ORDER — HEPARIN SODIUM 1000 [USP'U]/ML
25 INJECTION, SOLUTION INTRAVENOUS; SUBCUTANEOUS AS NEEDED
Status: DISCONTINUED | OUTPATIENT
Start: 2024-10-04 | End: 2024-10-04

## 2024-10-04 RX ORDER — ONDANSETRON 2 MG/ML
4 INJECTION INTRAMUSCULAR; INTRAVENOUS ONCE
Status: COMPLETED | OUTPATIENT
Start: 2024-10-04 | End: 2024-10-04

## 2024-10-04 RX ORDER — HYDROMORPHONE HYDROCHLORIDE 1 MG/ML
0.5 INJECTION, SOLUTION INTRAMUSCULAR; INTRAVENOUS; SUBCUTANEOUS ONCE
Status: COMPLETED | OUTPATIENT
Start: 2024-10-04 | End: 2024-10-04

## 2024-10-04 RX ORDER — AMOXICILLIN 250 MG
2 CAPSULE ORAL 2 TIMES DAILY PRN
Status: DISCONTINUED | OUTPATIENT
Start: 2024-10-04 | End: 2024-10-06 | Stop reason: HOSPADM

## 2024-10-04 RX ORDER — POLYETHYLENE GLYCOL 3350 17 G/17G
17 POWDER, FOR SOLUTION ORAL DAILY PRN
Status: DISCONTINUED | OUTPATIENT
Start: 2024-10-04 | End: 2024-10-06 | Stop reason: HOSPADM

## 2024-10-04 RX ORDER — BISACODYL 10 MG
10 SUPPOSITORY, RECTAL RECTAL DAILY PRN
Status: DISCONTINUED | OUTPATIENT
Start: 2024-10-04 | End: 2024-10-06 | Stop reason: HOSPADM

## 2024-10-04 RX ORDER — IOPAMIDOL 755 MG/ML
80 INJECTION, SOLUTION INTRAVASCULAR
Status: COMPLETED | OUTPATIENT
Start: 2024-10-04 | End: 2024-10-04

## 2024-10-04 RX ORDER — ENOXAPARIN SODIUM 100 MG/ML
1 INJECTION SUBCUTANEOUS ONCE
Status: DISCONTINUED | OUTPATIENT
Start: 2024-10-04 | End: 2024-10-04

## 2024-10-04 RX ORDER — HEPARIN SODIUM 1000 [USP'U]/ML
50 INJECTION, SOLUTION INTRAVENOUS; SUBCUTANEOUS AS NEEDED
Status: DISCONTINUED | OUTPATIENT
Start: 2024-10-04 | End: 2024-10-04

## 2024-10-04 RX ADMIN — HYDROMORPHONE HYDROCHLORIDE 0.5 MG: 1 INJECTION, SOLUTION INTRAMUSCULAR; INTRAVENOUS; SUBCUTANEOUS at 16:04

## 2024-10-04 RX ADMIN — ONDANSETRON 4 MG: 2 INJECTION INTRAMUSCULAR; INTRAVENOUS at 16:04

## 2024-10-04 RX ADMIN — MORPHINE SULFATE 2 MG: 2 INJECTION, SOLUTION INTRAMUSCULAR; INTRAVENOUS at 21:29

## 2024-10-04 RX ADMIN — HEPARIN SODIUM 18 UNITS/KG/HR: 10000 INJECTION, SOLUTION INTRAVENOUS at 21:15

## 2024-10-04 RX ADMIN — HEPARIN SODIUM 5340 UNITS: 1000 INJECTION INTRAVENOUS; SUBCUTANEOUS at 20:52

## 2024-10-04 RX ADMIN — IOPAMIDOL 80 ML: 755 INJECTION, SOLUTION INTRAVENOUS at 18:32

## 2024-10-04 NOTE — ED PROVIDER NOTES
EMERGENCY DEPARTMENT ENCOUNTER    Pt Name: Elliot Jean Baptiste  MRN: [id number]  Pt :   1950  Room Number:    Date of encounter:  10/4/2024  PCP: Dandy Teran DO  ED Provider: Greg Waterman MD    Historian: Patient and his wife      HPI:  Chief Complaint: Right flank pain        Context: Elliot Jean Baptiste is a 74 y.o. male who presents to the ED c/o right flank pain ongoing for the last 36 hours.  The patient feels that this is very similar to prior renal colic from nephrolithiasis.  He does note some pain in his right flank with deep inspiration.  He notes that palpation significantly increases his discomfort.  This past week he saw Dr. Dubon, his urologist and was told he had a slight urinary tract infection.  He was placed on doxycycline 10-day course.  He was also noted to have an elevated PSA at that time.    Patient currently rates his pain 8 out of 10 on the pain scale.  He has not eaten food today as he has been in a fair amount of discomfort.    No history of DVT or PE.  He did travel for several hours in August but did not notice any swelling of his legs.  He did as complained of some leg cramping on the left side in recent days.  He is not anticoagulated.    PAST MEDICAL HISTORY  Past Medical History:   Diagnosis Date    Allergic     Arthritis     Benign prostatic hyperplasia     Cancer     skin    Cataract     Colon polyp     Depression     Enlarged prostate     Gallstones     History of medical problems     poor blood clotting     HL (hearing loss)     Hypertension     Kidney infection     Kidney stone     Migraine     Prostatitis          PAST SURGICAL HISTORY  Past Surgical History:   Procedure Laterality Date    APPENDECTOMY      CATARACT EXTRACTION Bilateral     CHOLECYSTECTOMY      COLONOSCOPY      CYSTOSCOPY      CYSTOSCOPY TRANSURETHRAL RESECTION OF PROSTATE N/A 2023    Procedure: CYSTOSCOPY TRANSURETHRAL RESECTION OF PROSTATE GREENLIGHT;  Surgeon: Ling  MD Cheri;  Location: [facility];  Service: Urology;  Laterality: N/A;    GALLBLADDER SURGERY      HEMORRHOIDECTOMY      KIDNEY STONE SURGERY      KNEE CARTILAGE SURGERY Left     LITHOTRIPSY      PROSTATE SURGERY  2023    ULNAR NERVE REPAIR Right          FAMILY HISTORY  Family History   Problem Relation Age of Onset    Cancer Mother         1 kidney removed    Hypertension Mother     Migraines Mother     Osteoporosis Mother     Kidney cancer Mother         Kidney removed    Hypertension Father     Hyperlipidemia Father         Bypass surgeries, valve repair    Hearing loss Father         work related needed hearing aids    Heart disease Father     Kidney disease Father         loss of kidney function    Vision loss Father         Macular degeneration    Migraines Sister     Stroke Paternal Grandmother          of a stroke         SOCIAL HISTORY  Social History     Socioeconomic History    Marital status:    Tobacco Use    Smoking status: Never     Passive exposure: Past    Smokeless tobacco: Never   Vaping Use    Vaping status: Never Used   Substance and Sexual Activity    Alcohol use: Yes     Comment: social occasions    Drug use: No    Sexual activity: Not Currently     Partners: Female         ALLERGIES  Patient has no known allergies.        REVIEW OF SYSTEMS  Review of Systems       All systems reviewed and negative except for those discussed in HPI.       PHYSICAL EXAM    I have reviewed the triage vital signs and nursing notes.    ED Triage Vitals [10/04/24 1326]   Temp Heart Rate Resp BP SpO2   98.2 °F (36.8 °C) 63 18 134/69 95 %      Temp src Heart Rate Source Patient Position BP Location FiO2 (%)   Oral Monitor Sitting Left arm --       Physical Exam  GENERAL:   Appears uncomfortable but nontoxic.  HENT: Nares patent.  EYES: No scleral icterus.  CV: Regular rhythm, regular rate.  No murmurs gallops rubs  RESPIRATORY: Normal effort.  No audible wheezes, rales or rhonchi.  Clear to  auscultation  ABDOMEN: Soft, nontender  MUSCULOSKELETAL: No deformities.  Very reproducible right flank pain/tenderness to palpation  NEURO: Alert, moves all extremities, follows commands.  SKIN: Warm, dry, no rash visualized.      LAB RESULTS  Recent Results (from the past 24 hour(s))   Urinalysis With Microscopic If Indicated (No Culture) - Urine, Clean Catch    Collection Time: 10/04/24  1:46 PM    Specimen: Urine, Clean Catch   Result Value Ref Range    Color, UA Yellow Yellow, Straw    Appearance, UA Clear Clear    pH, UA <=5.0 5.0 - 8.0    Specific Gravity, UA 1.023 1.001 - 1.030    Glucose, UA Negative Negative    Ketones, UA 15 mg/dL (1+) (A) Negative    Bilirubin, UA Negative Negative    Blood, UA Negative Negative    Protein, UA Trace (A) Negative    Leuk Esterase, UA Negative Negative    Nitrite, UA Negative Negative    Urobilinogen, UA 0.2 E.U./dL 0.2 - 1.0 E.U./dL   Comprehensive Metabolic Panel    Collection Time: 10/04/24  1:51 PM    Specimen: Blood   Result Value Ref Range    Glucose 125 (H) 65 - 99 mg/dL    BUN 25 (H) 8 - 23 mg/dL    Creatinine 1.79 (H) 0.76 - 1.27 mg/dL    Sodium 136 136 - 145 mmol/L    Potassium 4.7 3.5 - 5.2 mmol/L    Chloride 102 98 - 107 mmol/L    CO2 23.0 22.0 - 29.0 mmol/L    Calcium 9.5 8.6 - 10.5 mg/dL    Total Protein 7.6 6.0 - 8.5 g/dL    Albumin 4.2 3.5 - 5.2 g/dL    ALT (SGPT) 20 1 - 41 U/L    AST (SGOT) 23 1 - 40 U/L    Alkaline Phosphatase 87 39 - 117 U/L    Total Bilirubin 3.6 (H) 0.0 - 1.2 mg/dL    Globulin 3.4 gm/dL    A/G Ratio 1.2 g/dL    BUN/Creatinine Ratio 14.0 7.0 - 25.0    Anion Gap 11.0 5.0 - 15.0 mmol/L    eGFR 38.8 (L) >60.0 mL/min/1.73   Lipase    Collection Time: 10/04/24  1:51 PM    Specimen: Blood   Result Value Ref Range    Lipase 21 13 - 60 U/L   Lactic Acid, Plasma    Collection Time: 10/04/24  1:51 PM    Specimen: Blood   Result Value Ref Range    Lactate 1.0 0.5 - 2.0 mmol/L   Green Top (Gel)    Collection Time: 10/04/24  1:51 PM   Result Value  Ref Range    Extra Tube Hold for add-ons.    Lavender Top    Collection Time: 10/04/24  1:51 PM   Result Value Ref Range    Extra Tube hold for add-on    Gold Top - SST    Collection Time: 10/04/24  1:51 PM   Result Value Ref Range    Extra Tube Hold for add-ons.    Gray Top    Collection Time: 10/04/24  1:51 PM   Result Value Ref Range    Extra Tube Hold for add-ons.    Light Blue Top    Collection Time: 10/04/24  1:51 PM   Result Value Ref Range    Extra Tube Hold for add-ons.    CBC Auto Differential    Collection Time: 10/04/24  1:51 PM    Specimen: Blood   Result Value Ref Range    WBC 9.14 3.40 - 10.80 10*3/mm3    RBC 4.28 4.14 - 5.80 10*6/mm3    Hemoglobin 13.0 13.0 - 17.7 g/dL    Hematocrit 37.5 37.5 - 51.0 %    MCV 87.6 79.0 - 97.0 fL    MCH 30.4 26.6 - 33.0 pg    MCHC 34.7 31.5 - 35.7 g/dL    RDW 12.3 12.3 - 15.4 %    RDW-SD 39.2 37.0 - 54.0 fl    MPV 10.0 6.0 - 12.0 fL    Platelets 158 140 - 450 10*3/mm3    Neutrophil % 82.4 (H) 42.7 - 76.0 %    Lymphocyte % 8.0 (L) 19.6 - 45.3 %    Monocyte % 7.5 5.0 - 12.0 %    Eosinophil % 0.7 0.3 - 6.2 %    Basophil % 0.4 0.0 - 1.5 %    Immature Grans % 1.0 (H) 0.0 - 0.5 %    Neutrophils, Absolute 7.53 (H) 1.70 - 7.00 10*3/mm3    Lymphocytes, Absolute 0.73 0.70 - 3.10 10*3/mm3    Monocytes, Absolute 0.69 0.10 - 0.90 10*3/mm3    Eosinophils, Absolute 0.06 0.00 - 0.40 10*3/mm3    Basophils, Absolute 0.04 0.00 - 0.20 10*3/mm3    Immature Grans, Absolute 0.09 (H) 0.00 - 0.05 10*3/mm3    nRBC 0.0 0.0 - 0.2 /100 WBC   D-dimer, Quantitative    Collection Time: 10/04/24  1:51 PM    Specimen: Blood   Result Value Ref Range    D-Dimer, Quantitative 2.68 (H) 0.00 - 0.76 MCGFEU/mL       If labs were ordered, I independently reviewed the results and considered them in treating the patient.        RADIOLOGY  CT Angiogram Chest    Result Date: 10/4/2024  CT ANGIOGRAM CHEST Date of Exam: 10/4/2024 6:31 PM EDT Indication: right flank/inspirtory pain with pos d dimer. Comparison: None  available. Technique: CTA of the chest was performed after the uneventful intravenous administration of 80 cc Isovue-370 IV contrast. . Reconstructed coronal and sagittal images were also obtained. In addition, a 3-D volume rendered image was created for interpretation. Automated exposure control and iterative reconstruction methods were used. Findings: The thyroid gland is normal. The subglottic airway is clear. Right lower lobe pulmonary embolus with the proximal end in the segmental branch of the right lower lobe. Left upper lobe pulmonary nodule with the proximal embolus within the lingular branch of the left upper lobe. No consolidation. Atelectatic changes at the right lung base. Trace right effusion. The heart and pericardium are normal. Severe atheromatous disease of the coronary vessels. No adenopathy. Prior cholecystectomy. The visualized upper abdomen is otherwise normal. No rib fractures are appreciated.     Segmental branch bilateral pulmonary emboli. Findings were discussed with Dr. Waterman at 6:44 p.m. on 10/4/2024 Electronically Signed: River Tompkins MD  10/4/2024 6:46 PM EDT  Workstation ID: [id number]    CT Abdomen Pelvis Without Contrast    Result Date: 10/4/2024  CT ABDOMEN PELVIS WO CONTRAST Date of Exam: 10/4/2024 3:42 PM EDT Indication: right flank pain. Comparison: 10/13/2023 Technique: Axial CT images were obtained of the abdomen and pelvis without the administration of contrast. Reconstructed coronal and sagittal images were also obtained. Automated exposure control and iterative construction methods were used. FINDINGS: Lung bases: Right lower lobe calcified granuloma. Atelectatic changes at the right lung base. Trace right effusion. Liver:No masses. No intrahepatic biliary ductal dilatation. Spleen:No masses. No perisplenic hematoma. Pancreas:No pancreatic masses. No evidence of pancreatitis. Gallbladder and common bile duct: Prior cholecystectomy. Adrenal glands:No adrenal masses Kidneys  and ureters: Rounded 1.6 cm isodensity arising from the lateral right kidney again noted compared with 10/13/2023 not significantly changed in size. No calculi present within the ureters. Normal caliber ureters. Urinary bladder:No urinary bladder wall thickening. No bladder masses. Small bowel:Normal caliber small bowel. Large bowel:No diverticulosis or diverticulitis. No large bowel masses are appreciated Appendix: Not seen however there is no evidence of appendicitis GENITOURINARY: Normal prostate Ascites or pneumoperitoneum:None. Adenopathy:None present Osseous structures: The proximal femurs are intact. Degenerative changes of the hips. The sacroiliac joints are normal. Degenerative changes of the lumbar spine. The spinous and transverse processes are intact. Other findings: None     No renal or ureteric calculi. Stable exophytic right renal cyst. Atelectatic changes at the right lung base. No rib fractures. Trace right effusion Electronically Signed: River Tompkins MD  10/4/2024 3:50 PM EDT  Workstation ID: [id number]     I ordered and independently reviewed the above noted radiographic studies.      I viewed images of CT chest abdomen and pelvis which showed bilateral pulmonary emboli but no renal stones in the ureters per my independent interpretation.    See radiologist's dictation for official interpretation.        PROCEDURES    Procedures    No orders to display       MEDICATIONS GIVEN IN ER    Medications   Sodium Chloride (PF) 0.9 % 10 mL (has no administration in time range)   Enoxaparin Sodium (LOVENOX) syringe 70 mg (has no administration in time range)   ondansetron (ZOFRAN) injection 4 mg (4 mg Intravenous Given 10/4/24 1604)   HYDROmorphone (DILAUDID) injection 0.5 mg (0.5 mg Intravenous Given 10/4/24 1604)   iopamidol (ISOVUE-370) 76 % injection 80 mL (80 mL Intravenous Given 10/4/24 2352)         MEDICAL DECISION MAKING, PROGRESS, and CONSULTS    All labs, if obtained, have been independently  reviewed by me.  All radiology studies, if obtained, have been reviewed by me and the radiologist dictating the report.  All EKG's, if obtained, have been independently viewed and interpreted by me/my attending physician.      Discussion below represents my analysis of pertinent findings related to patient's condition, differential diagnosis, treatment plan and final disposition.                         Differential diagnosis:    Renal colic versus pulmonary embolism, etc.      Additional sources:    - Discussed/ obtained information from independent historians: I spoke with patient's wife at bedside.    - External (non-ED) record review: I reviewed the cystoscopy note by Dr. Dubon, of urology dated 12/7/2023    - Chronic or social conditions impacting care: Non-smoker.  Drinks alcohol.      - Shared decision making: Patient/patient representative in complete agreement with current plans for evaluation and management.      Orders placed during this visit:  Orders Placed This Encounter   Procedures    CT Abdomen Pelvis Without Contrast    CT Angiogram Chest    [place] Draw    Comprehensive Metabolic Panel    Lipase    Urinalysis With Microscopic If Indicated (No Culture) - Urine, Clean Catch    Lactic Acid, Plasma    CBC Auto Differential    D-dimer, Quantitative    NPO Diet NPO Type: Strict NPO    Undress & Gown    Insert Peripheral IV    Initiate Observation Status    ED Bed Request    CBC & Differential    Green Top (Gel)    Lavender Top    Gold Top - SST    Gray Top    Light Blue Top         Additional orders considered but not ordered:  IV contrasted CT scan of the abdomen and pelvis    ED Course:    Consultants:      ED Course as of 10/04/24 1941   Fri Oct 04, 2024   1820 The patient had significant right flank pain with inspiration.  D-dimer is strongly positive.  I have ordered CTA chest. [MS]      ED Course User Index  [MS] Greg Waterman MD              Shared Decision Making:  After my  consideration of clinical presentation and any laboratory/radiology studies obtained, I discussed the findings with the patient/patient representative who is in agreement with the treatment plan and the final disposition.   Risks and benefits of discharge and/or observation/admission were discussed.       AS OF 19:41 EDT VITALS:    BP - 131/80  HR - 59  TEMP - 98.2 °F (36.8 °C) (Oral)  O2 SATS - 97%                  DIAGNOSIS  Final diagnoses:   Bilateral pulmonary embolism   Right flank pain         DISPOSITION  Admission      Please note that portions of this document were completed with voice recognition software.        Greg Waterman MD  10/04/24 1227

## 2024-10-04 NOTE — ED NOTES
Elliot Jean Baptiste    Nursing Report ED to Floor:  Mental status: AOx4  Ambulatory status: ad nicole  Oxygen Therapy:  RA  Cardiac Rhythm: NSR  Admitted from: home  Safety Concerns:  none  Social Issues: none  ED Room #:  11    ED Nurse Phone Extension - [phone] or may call [phone].      HPI:   Chief Complaint   Patient presents with    Flank Pain       Past Medical History:  Past Medical History:   Diagnosis Date    Allergic     Arthritis     Benign prostatic hyperplasia     Cancer     skin    Cataract     Colon polyp     Depression     Enlarged prostate     Gallstones     History of medical problems     poor blood clotting     HL (hearing loss)     Hypertension     Kidney infection     Kidney stone     Migraine     Prostatitis         Past Surgical History:  Past Surgical History:   Procedure Laterality Date    APPENDECTOMY      CATARACT EXTRACTION Bilateral     CHOLECYSTECTOMY  1995    COLONOSCOPY      CYSTOSCOPY      CYSTOSCOPY TRANSURETHRAL RESECTION OF PROSTATE N/A 12/07/2023    Procedure: CYSTOSCOPY TRANSURETHRAL RESECTION OF PROSTATE GREENLIGHT;  Surgeon: Cheri Dubon MD;  Location: [facility];  Service: Urology;  Laterality: N/A;    GALLBLADDER SURGERY      HEMORRHOIDECTOMY      KIDNEY STONE SURGERY      KNEE CARTILAGE SURGERY Left     LITHOTRIPSY  1993    PROSTATE SURGERY  December 7, 2023    ULNAR NERVE REPAIR Right         Admitting Doctor:   Curtis Duarte MD    Consulting Provider(s):  Consults       No orders found from 9/5/2024 to 10/5/2024.             Admitting Diagnosis:   The primary encounter diagnosis was Bilateral pulmonary embolism. A diagnosis of Right flank pain was also pertinent to this visit.    Most Recent Vitals:   Vitals:    10/04/24 1400 10/04/24 1500 10/04/24 1600 10/04/24 1900   BP: 132/64 138/72 156/77 131/80   BP Location:       Patient Position:       Pulse: 58 57 59    Resp:       Temp:       TempSrc:       SpO2: 94% 94% 97%    Weight:       Height:           Active LDAs/IV Access:    Lines, Drains & Airways       Active LDAs       Name Placement date Placement time Site Days    Peripheral IV 10/04/24 1603 Left Antecubital 10/04/24  1603  Antecubital  less than 1    Urethral Catheter Non-latex;Straight-tip 20 Fr. 12/07/23  --  -- 302                    Labs (abnormal labs have a star):   Labs Reviewed   COMPREHENSIVE METABOLIC PANEL - Abnormal; Notable for the following components:       Result Value    Glucose 125 (*)     BUN 25 (*)     Creatinine 1.79 (*)     Total Bilirubin 3.6 (*)     eGFR 38.8 (*)     All other components within normal limits    Narrative:     GFR Normal >60  Chronic Kidney Disease <60  Kidney Failure <15    The GFR formula is only valid for adults with stable renal function between ages 18 and 70.   URINALYSIS W/ MICROSCOPIC IF INDICATED (NO CULTURE) - Abnormal; Notable for the following components:    Ketones, UA 15 mg/dL (1+) (*)     Protein, UA Trace (*)     All other components within normal limits    Narrative:     Urine microscopic not indicated.   CBC WITH AUTO DIFFERENTIAL - Abnormal; Notable for the following components:    Neutrophil % 82.4 (*)     Lymphocyte % 8.0 (*)     Immature Grans % 1.0 (*)     Neutrophils, Absolute 7.53 (*)     Immature Grans, Absolute 0.09 (*)     All other components within normal limits   D-DIMER, QUANTITATIVE - Abnormal; Notable for the following components:    D-Dimer, Quantitative 2.68 (*)     All other components within normal limits    Narrative:     According to the assay 's published package insert, a normal (<0.50 MCGFEU/mL) D-dimer result in conjunction with a non-high clinical probability assessment, excludes deep vein thrombosis (DVT) and pulmonary embolism (PE) with high sensitivity.    D-dimer values increase with age and this can make VTE exclusion of an older population difficult. To address this, the [employer], based on best available evidence and recent guidelines, recommends that  "clinicians use age-adjusted D-dimer thresholds in patients greater than 50 years of age with: a) a low probability of PE who do not meet all Pulmonary Embolism Rule Out Criteria, or b) in those with intermediate probability of PE.   The formula for an age-adjusted D-dimer cut-off is \"age/100\".  For example, a 60 year old patient would have an age-adjusted cut-off of 0.60 MCGFEU/mL and an 80 year old 0.80 MCGFEU/mL.   LIPASE - Normal   LACTIC ACID, PLASMA - Normal   RAINBOW DRAW    Narrative:     The following orders were created for panel order Elsa Draw.  Procedure                               Abnormality         Status                     ---------                               -----------         ------                     Green Top (Gel)[[id number]]                                  Final result               Lavender Top[[id number]]                                     Final result               Gold Top - SST[[id number]]                                   Final result               Aguilar Top[[id number]]                                         Final result               Light Blue Top[[id number]]                                   Final result                 Please view results for these tests on the individual orders.   CBC AND DIFFERENTIAL    Narrative:     The following orders were created for panel order CBC & Differential.  Procedure                               Abnormality         Status                     ---------                               -----------         ------                     CBC Auto Differential[[id number]]        Abnormal            Final result                 Please view results for these tests on the individual orders.   GREEN TOP   LAVENDER TOP   GOLD TOP - SST   GRAY TOP   LIGHT BLUE TOP       Meds Given in ED:   Medications   Sodium Chloride (PF) 0.9 % 10 mL (has no administration in time range)   Enoxaparin Sodium (LOVENOX) syringe 70 mg (has no administration in time range) "   ondansetron (ZOFRAN) injection 4 mg (4 mg Intravenous Given 10/4/24 1604)   HYDROmorphone (DILAUDID) injection 0.5 mg (0.5 mg Intravenous Given 10/4/24 1604)   iopamidol (ISOVUE-370) 76 % injection 80 mL (80 mL Intravenous Given 10/4/24 6152)           Last NIH score:                                                          Dysphagia screening results:        [place] Coma Scale:  No data recorded     CIWA:        Restraint Type:            Isolation Status:  No active isolations

## 2024-10-04 NOTE — H&P
[facility]    Patient Identification:  Name:  Elliot Jean Baptiste  Age:  74 y.o.  Sex:  male  :  1950  MRN:  [id number]   Visit Number:  [id number]  Admit Date: 10/4/2024   Room number:    Primary Care Physician:  Dandy Teran DO    Date of Admission: 10/4/2024     Subjective     Chief complaint:    Chief Complaint   Patient presents with    Flank Pain       History of presenting illness:    This is a 74-year-old male with a past medical history of renal calculus, hyperlipidemia, CKD and BPH presenting with right flank pain.  Patient states that for the last 36 hours he has been experiencing right flank pain. The patient feels that this is very similar to prior kidney stone.  Patient states the pain is worse on inspiration aswell as palpation on the area. Of note, he was recently diagnosed with a UTI for which he is taking doxycycline. He has no history of DVT or PE.  Pt states he had a prostate procedure back in December and drove to [place] (8hr drive) and then back in August. Pt states that after driving that many hours he developed some leg cramps, but denied any severe pain or swelling.      In ED, blood pressure 132/64, heart 58, respiratory rate 18, temperature 98.2 and O2 saturation 95% on room air.  Labs on arrival showed a sodium 136, potassium 4.7, BUN 25, creatinine 1.79, D-dimer 2.68, WBC 9.14, hemoglobin 13, platelet count 58. CTA chest showed Segmental branch bilateral pulmonary emboli.    ---------------------------------------------------------------------------------------------------------------------   Review of Systems   Constitutional:  Negative for chills.   HENT:  Negative for drooling and rhinorrhea.    Eyes:  Negative for redness.   Respiratory:  Negative for choking.    Cardiovascular:  Negative for leg swelling.   Gastrointestinal:  Negative for constipation.   Endocrine: Negative for polydipsia.   Genitourinary:  Positive  for flank pain.   Musculoskeletal:  Negative for back pain.   Allergic/Immunologic: Negative for food allergies.   Neurological:  Negative for numbness.   Psychiatric/Behavioral:  Negative for confusion.      ---------------------------------------------------------------------------------------------------------------------   Past Medical History:   Diagnosis Date    Allergic     Arthritis     Benign prostatic hyperplasia     Cancer     skin    Cataract     Colon polyp     Depression     Enlarged prostate     Gallstones     History of medical problems     poor blood clotting     HL (hearing loss)     Hypertension     Kidney infection     Kidney stone     Migraine     Prostatitis      Past Surgical History:   Procedure Laterality Date    APPENDECTOMY      CATARACT EXTRACTION Bilateral     CHOLECYSTECTOMY      COLONOSCOPY      CYSTOSCOPY      CYSTOSCOPY TRANSURETHRAL RESECTION OF PROSTATE N/A 2023    Procedure: CYSTOSCOPY TRANSURETHRAL RESECTION OF PROSTATE GREENLIGHT;  Surgeon: Cheri Dubon MD;  Location: [facility];  Service: Urology;  Laterality: N/A;    GALLBLADDER SURGERY      HEMORRHOIDECTOMY      KIDNEY STONE SURGERY      KNEE CARTILAGE SURGERY Left     LITHOTRIPSY      PROSTATE SURGERY  2023    ULNAR NERVE REPAIR Right      Family History   Problem Relation Age of Onset    Cancer Mother         1 kidney removed    Hypertension Mother     Migraines Mother     Osteoporosis Mother     Kidney cancer Mother         Kidney removed    Hypertension Father     Hyperlipidemia Father         Bypass surgeries, valve repair    Hearing loss Father         work related needed hearing aids    Heart disease Father     Kidney disease Father         loss of kidney function    Vision loss Father         Macular degeneration    Migraines Sister     Stroke Paternal Grandmother          of a stroke     Social History     Socioeconomic History    Marital status:    Tobacco Use    Smoking status:  Never     Passive exposure: Past    Smokeless tobacco: Never   Vaping Use    Vaping status: Never Used   Substance and Sexual Activity    Alcohol use: Yes     Comment: social occasions    Drug use: No    Sexual activity: Not Currently     Partners: Female     ---------------------------------------------------------------------------------------------------------------------   Allergies:  Patient has no known allergies.  ---------------------------------------------------------------------------------------------------------------------   Medications below are reported home medications pulling from within the system; at this time, these medications have not been reconciled unless otherwise specified and are in the verification process for further verifcation as current home medications.      Prior to Admission Medications       Prescriptions Last Dose Informant Patient Reported? Taking?    chlorhexidine (PERIDEX) 0.12 % solution   Yes No    SWISH WITH 1/2 OUNCE FOR 30 SECONDS AND SPIT , TWO TIMES A DAY FOLLOWING BRUSHING    Cholecalciferol (Vitamin D3) 50 MCG (2000 UT) capsule   Yes No    Take 1 capsule by mouth Daily.    Cholecalciferol (VITAMIN D3) 5000 units capsule capsule  Self Yes No    Take 1 capsule by mouth Daily.    doxycycline (VIBRAMYCIN) 100 MG capsule   No No    Take 1 capsule by mouth 2 (Two) Times a Day for 10 days.    fluorouracil (EFUDEX) 5 % cream   Yes No    APPLY A THIN LAYER TO SCALP, TEMPLES, SIDEBBURNS, AND FOREHEAD AT BEDTIME FOR 2 WEEKS. WASH OFF EACH MORNING.    pravastatin (PRAVACHOL) 40 MG tablet  Self No No    Take 1 tablet by mouth Every Night.    tamsulosin (FLOMAX) 0.4 MG capsule 24 hr capsule  Self Yes No    Take 1 capsule by mouth Daily.          Objective     Vital Signs:  Temp:  [98.2 °F (36.8 °C)] 98.2 °F (36.8 °C)  Heart Rate:  [57-63] 59  Resp:  [18] 18  BP: (131-156)/(64-99) 131/80    Mean Arterial Pressure (Non-Invasive) for the past 24 hrs (Last 3 readings):   Noninvasive  MAP (mmHg)   10/04/24 1900 114   10/04/24 1600 97   10/04/24 1500 100     SpO2:  [94 %-97 %] 97 %  on   ;   Device (Oxygen Therapy): room air  Body mass index is 27.78 kg/m².    Wt Readings from Last 3 Encounters:   10/04/24 66.7 kg (147 lb)   10/01/24 68.7 kg (151 lb 6.4 oz)   03/27/24 68.8 kg (151 lb 9.6 oz)      ----------------------------------------------------------------------------------------------------------------------  PHYSICAL EXAMINATION:  GENERAL: The patient is well developed and nontoxic.  HEENT: Nonicteric sclerae, PERRLA, EOMI. Oropharynx clear. Moist mucous membranes. Conjunctivae appear well perfused.  CHEST: Chest wall is nontender.  HEART: Regular rate and rhythm without murmurs.  LUNGS: Clear to auscultation bilaterally.  ABDOMEN: Soft, positive bowel sounds, nontender, no organomegaly.  RECTAL: Deferred.  SKIN: No rash, no excessive bruising, petechiae, or purpura.  NEUROLOGIC: Cranial nerves II-XII intact without motor/sensory deficit.    ---------------------------------------------------------------------------------------------------------------------  --------------------------------------------------------------------------------------------------------------------  LABS:    CBC and coagulation:  Results from last 7 days   Lab Units 10/04/24  1351 10/01/24  1310   LACTATE mmol/L 1.0  --    WBC 10*3/mm3 9.14 5.81   HEMOGLOBIN g/dL 13.0 14.1   HEMATOCRIT % 37.5 41.1   MCV fL 87.6 88.2   MCHC g/dL 34.7 34.3   PLATELETS 10*3/mm3 158 200   D DIMER QUANT MCGFEU/mL 2.68*  --      Acid/base balance:      Renal and electrolytes:  Results from last 7 days   Lab Units 10/04/24  1351 10/01/24  1310   SODIUM mmol/L 136 141   POTASSIUM mmol/L 4.7 4.8   CHLORIDE mmol/L 102 105   CO2 mmol/L 23.0 23.5   BUN mg/dL 25* 32*   CREATININE mg/dL 1.79* 1.78*   CALCIUM mg/dL 9.5 10.2   GLUCOSE mg/dL 125* 112*     Estimated Creatinine Clearance: 28.9 mL/min (A) (by C-G formula based on SCr of 1.79 mg/dL  "(H)).    Liver and pancreatic function:  Results from last 7 days   Lab Units 10/04/24  1351 10/01/24  1310   ALBUMIN g/dL 4.2 4.5   BILIRUBIN mg/dL 3.6* 2.3*   ALK PHOS U/L 87 82   AST (SGOT) U/L 23 23   ALT (SGPT) U/L 20 18   LIPASE U/L 21  --      Endocrine function:  Lab Results   Component Value Date    HGBA1C 5.90 (H) 10/01/2024     Point of care bedside glucose levels:      Lab Results   Component Value Date    TSH 2.540 10/01/2024    FREET4 1.16 10/01/2024     Cardiac:      Results from last 7 days   Lab Units 10/01/24  1310   CHOLESTEROL mg/dL 154   TRIGLYCERIDES mg/dL 131   HDL CHOL mg/dL 49   LDL CHOL mg/dL 82     Cultures:  Lab Results   Component Value Date    COLORU Yellow 10/04/2024    CLARITYU Clear 10/04/2024    SPECGRAV 1.020 10/02/2024    PHUR <=5.0 10/04/2024    GLUCOSEU Negative 10/04/2024    KETONESU 15 mg/dL (1+) (A) 10/04/2024    BLOODU Negative 10/04/2024    NITRITEU Negative 10/04/2024    LEUKOCYTESUR Negative 10/04/2024    BILIRUBINUR Negative 10/04/2024    UROBILINOGEN 0.2 E.U./dL 10/04/2024    RBCUA 0-2 10/01/2024    WBCUA 3-5 (A) 10/01/2024    BACTERIA None Seen 10/01/2024     Microbiology Results (last 10 days)       Procedure Component Value - Date/Time    Urine Culture - Urine, Urine, Clean Catch [[id number]]  (Normal) Collected: 10/01/24 1310    Lab Status: Final result Specimen: Urine, Clean Catch Updated: 10/03/24 1059     Urine Culture No growth            No results found for: \"PREGTESTUR\", \"PREGSERUM\", \"HCG\", \"HCGQUANT\"  Pain Management Panel           No data to display                I have personally looked at the labs and they are summarized above.  ----------------------------------------------------------------------------------------------------------------------  Detailed radiology reports for the last 24 hours:    Imaging Results (Last 24 Hours)       Procedure Component Value Units Date/Time    CT Angiogram Chest [[id number]] Collected: 10/04/24 9073     Updated: " 10/04/24 1849    Narrative:      CT ANGIOGRAM CHEST    Date of Exam: 10/4/2024 6:31 PM EDT    Indication: right flank/inspirtory pain with pos d dimer.    Comparison: None available.    Technique: CTA of the chest was performed after the uneventful intravenous administration of 80 cc Isovue-370 IV contrast. . Reconstructed coronal and sagittal images were also obtained. In addition, a 3-D volume rendered image was created for   interpretation. Automated exposure control and iterative reconstruction methods were used.    Findings: The thyroid gland is normal. The subglottic airway is clear. Right lower lobe pulmonary embolus with the proximal end in the segmental branch of the right lower lobe. Left upper lobe pulmonary nodule with the proximal embolus within the   lingular branch of the left upper lobe. No consolidation. Atelectatic changes at the right lung base. Trace right effusion. The heart and pericardium are normal. Severe atheromatous disease of the coronary vessels. No adenopathy.    Prior cholecystectomy. The visualized upper abdomen is otherwise normal.    No rib fractures are appreciated.      Impression:      Segmental branch bilateral pulmonary emboli.    Findings were discussed with Dr. Waterman at 6:44 p.m. on 10/4/2024      Electronically Signed: River Tompkins MD    10/4/2024 6:46 PM EDT    Workstation ID: [id number]    CT Abdomen Pelvis Without Contrast [[id number]] Collected: 10/04/24 1546     Updated: 10/04/24 1553    Narrative:      CT ABDOMEN PELVIS WO CONTRAST    Date of Exam: 10/4/2024 3:42 PM EDT    Indication: right flank pain.    Comparison: 10/13/2023    Technique: Axial CT images were obtained of the abdomen and pelvis without the administration of contrast. Reconstructed coronal and sagittal images were also obtained. Automated exposure control and iterative construction methods were used.    FINDINGS:    Lung bases: Right lower lobe calcified granuloma. Atelectatic changes at the right lung  base. Trace right effusion.     Liver:No masses. No intrahepatic biliary ductal dilatation.    Spleen:No masses. No perisplenic hematoma.    Pancreas:No pancreatic masses. No evidence of pancreatitis.    Gallbladder and common bile duct: Prior cholecystectomy.    Adrenal glands:No adrenal masses    Kidneys and ureters: Rounded 1.6 cm isodensity arising from the lateral right kidney again noted compared with 10/13/2023 not significantly changed in size. No calculi present within the ureters. Normal caliber ureters.    Urinary bladder:No urinary bladder wall thickening. No bladder masses.    Small bowel:Normal caliber small bowel.    Large bowel:No diverticulosis or diverticulitis. No large bowel masses are appreciated    Appendix: Not seen however there is no evidence of appendicitis    GENITOURINARY: Normal prostate    Ascites or pneumoperitoneum:None.    Adenopathy:None present    Osseous structures: The proximal femurs are intact. Degenerative changes of the hips. The sacroiliac joints are normal. Degenerative changes of the lumbar spine. The spinous and transverse processes are intact.    Other findings: None      Impression:      No renal or ureteric calculi. Stable exophytic right renal cyst. Atelectatic changes at the right lung base. No rib fractures. Trace right effusion          Electronically Signed: River Tompkins MD    10/4/2024 3:50 PM EDT    Workstation ID: [id number]          Final impressions for the last 30 days of radiology reports:    CT Angiogram Chest    Result Date: 10/4/2024  Segmental branch bilateral pulmonary emboli. Findings were discussed with Dr. Waterman at 6:44 p.m. on 10/4/2024 Electronically Signed: River Tompkins MD  10/4/2024 6:46 PM EDT  Workstation ID: [id number]    CT Abdomen Pelvis Without Contrast    Result Date: 10/4/2024  No renal or ureteric calculi. Stable exophytic right renal cyst. Atelectatic changes at the right lung base. No rib fractures. Trace right effusion Electronically  Signed: River Tompkins MD  10/4/2024 3:50 PM EDT  Workstation ID: [id number]       Assessment & Plan     This is a 74-year-old male with a past medical history of renal calculus, hyperlipidemia, CKD and BPH presenting with right flank pain.      Pulmonary embolism:  - Start heparin gtt  - Monitor on TELE  - Monitor O2Sat  - F/U Echo  - Trend troponin  - F/U PBNP  - R/O hypercoagulable cause; Factor V leiden, Protein C, S, Antithrombin 3 deficiency, homycesitein levels and antiphospholipid antibodies    Flank pain:  - Supportive care      Curtis Duarte MD  [facility]  10/04/24  19:51 EDT

## 2024-10-05 ENCOUNTER — APPOINTMENT (OUTPATIENT)
Dept: CARDIOLOGY | Facility: HOSPITAL | Age: 74
End: 2024-10-05
Payer: MEDICARE

## 2024-10-05 PROBLEM — N39.0 ACUTE UTI (URINARY TRACT INFECTION): Status: ACTIVE | Noted: 2024-10-05

## 2024-10-05 LAB
ANION GAP SERPL CALCULATED.3IONS-SCNC: 10 MMOL/L (ref 5–15)
ASCENDING AORTA: 3.6 CM
BASOPHILS # BLD AUTO: 0.04 10*3/MM3 (ref 0–0.2)
BASOPHILS NFR BLD AUTO: 0.6 % (ref 0–1.5)
BH CV ECHO MEAS - AO MAX PG: 7.4 MMHG
BH CV ECHO MEAS - AO MEAN PG: 4 MMHG
BH CV ECHO MEAS - AO ROOT DIAM: 3.5 CM
BH CV ECHO MEAS - AO V2 MAX: 136 CM/SEC
BH CV ECHO MEAS - AO V2 VTI: 29 CM
BH CV ECHO MEAS - AVA(I,D): 2.7 CM2
BH CV ECHO MEAS - EDV(CUBED): 117.6 ML
BH CV ECHO MEAS - EDV(MOD-SP2): 111 ML
BH CV ECHO MEAS - EDV(MOD-SP4): 103 ML
BH CV ECHO MEAS - EF(MOD-BP): 61.1 %
BH CV ECHO MEAS - EF(MOD-SP2): 57.6 %
BH CV ECHO MEAS - EF(MOD-SP4): 63.1 %
BH CV ECHO MEAS - ESV(CUBED): 27 ML
BH CV ECHO MEAS - ESV(MOD-SP2): 47.1 ML
BH CV ECHO MEAS - ESV(MOD-SP4): 38 ML
BH CV ECHO MEAS - FS: 38.8 %
BH CV ECHO MEAS - IVS/LVPW: 1 CM
BH CV ECHO MEAS - IVSD: 0.8 CM
BH CV ECHO MEAS - LA DIMENSION: 2.7 CM
BH CV ECHO MEAS - LAT PEAK E' VEL: 11.5 CM/SEC
BH CV ECHO MEAS - LV DIASTOLIC VOL/BSA (35-75): 62.4 CM2
BH CV ECHO MEAS - LV MASS(C)D: 131.2 GRAMS
BH CV ECHO MEAS - LV MAX PG: 6 MMHG
BH CV ECHO MEAS - LV MEAN PG: 3 MMHG
BH CV ECHO MEAS - LV SYSTOLIC VOL/BSA (12-30): 23 CM2
BH CV ECHO MEAS - LV V1 MAX: 122 CM/SEC
BH CV ECHO MEAS - LV V1 VTI: 24.5 CM
BH CV ECHO MEAS - LVIDD: 4.9 CM
BH CV ECHO MEAS - LVIDS: 3 CM
BH CV ECHO MEAS - LVOT AREA: 3.1 CM2
BH CV ECHO MEAS - LVOT DIAM: 2 CM
BH CV ECHO MEAS - LVPWD: 0.8 CM
BH CV ECHO MEAS - MED PEAK E' VEL: 12.1 CM/SEC
BH CV ECHO MEAS - MV A MAX VEL: 62.4 CM/SEC
BH CV ECHO MEAS - MV DEC TIME: 0.26 SEC
BH CV ECHO MEAS - MV E MAX VEL: 68.7 CM/SEC
BH CV ECHO MEAS - MV E/A: 1.1
BH CV ECHO MEAS - MV MAX PG: 2.5 MMHG
BH CV ECHO MEAS - MV MEAN PG: 1 MMHG
BH CV ECHO MEAS - MV V2 VTI: 29.9 CM
BH CV ECHO MEAS - MVA(VTI): 2.6 CM2
BH CV ECHO MEAS - PA ACC TIME: 0.07 SEC
BH CV ECHO MEAS - RAP SYSTOLE: 3 MMHG
BH CV ECHO MEAS - RVSP: 20 MMHG
BH CV ECHO MEAS - SV(LVOT): 77 ML
BH CV ECHO MEAS - SV(MOD-SP2): 63.9 ML
BH CV ECHO MEAS - SV(MOD-SP4): 65 ML
BH CV ECHO MEAS - SVI(LVOT): 46.6 ML/M2
BH CV ECHO MEAS - SVI(MOD-SP2): 38.7 ML/M2
BH CV ECHO MEAS - SVI(MOD-SP4): 39.4 ML/M2
BH CV ECHO MEAS - TAPSE (>1.6): 2.9 CM
BH CV ECHO MEAS - TR MAX PG: 18.3 MMHG
BH CV ECHO MEAS - TR MAX VEL: 212 CM/SEC
BH CV ECHO MEASUREMENTS AVERAGE E/E' RATIO: 5.82
BH CV VAS BP LEFT ARM: NORMAL MMHG
BH CV XLRA - RV BASE: 2.7 CM
BH CV XLRA - RV LENGTH: 6.4 CM
BH CV XLRA - RV MID: 1.9 CM
BH CV XLRA - TDI S': 19.7 CM/SEC
BUN SERPL-MCNC: 24 MG/DL (ref 8–23)
BUN/CREAT SERPL: 14.5 (ref 7–25)
CALCIUM SPEC-SCNC: 8.7 MG/DL (ref 8.6–10.5)
CHLORIDE SERPL-SCNC: 102 MMOL/L (ref 98–107)
CO2 SERPL-SCNC: 23 MMOL/L (ref 22–29)
CREAT SERPL-MCNC: 1.65 MG/DL (ref 0.76–1.27)
DEPRECATED RDW RBC AUTO: 38.4 FL (ref 37–54)
EGFRCR SERPLBLD CKD-EPI 2021: 42.8 ML/MIN/1.73
EOSINOPHIL # BLD AUTO: 0.19 10*3/MM3 (ref 0–0.4)
EOSINOPHIL NFR BLD AUTO: 2.9 % (ref 0.3–6.2)
ERYTHROCYTE [DISTWIDTH] IN BLOOD BY AUTOMATED COUNT: 12.1 % (ref 12.3–15.4)
GLUCOSE SERPL-MCNC: 120 MG/DL (ref 65–99)
HCT VFR BLD AUTO: 33.1 % (ref 37.5–51)
HCYS SERPL-MCNC: 13.1 UMOL/L (ref 0–15)
HGB BLD-MCNC: 11.5 G/DL (ref 13–17.7)
IMM GRANULOCYTES # BLD AUTO: 0.04 10*3/MM3 (ref 0–0.05)
IMM GRANULOCYTES NFR BLD AUTO: 0.6 % (ref 0–0.5)
LEFT ATRIUM VOLUME INDEX: 28.6 ML/M2
LYMPHOCYTES # BLD AUTO: 1.73 10*3/MM3 (ref 0.7–3.1)
LYMPHOCYTES NFR BLD AUTO: 26.3 % (ref 19.6–45.3)
MCH RBC QN AUTO: 29.9 PG (ref 26.6–33)
MCHC RBC AUTO-ENTMCNC: 34.7 G/DL (ref 31.5–35.7)
MCV RBC AUTO: 86.2 FL (ref 79–97)
MONOCYTES # BLD AUTO: 0.68 10*3/MM3 (ref 0.1–0.9)
MONOCYTES NFR BLD AUTO: 10.3 % (ref 5–12)
NEUTROPHILS NFR BLD AUTO: 3.9 10*3/MM3 (ref 1.7–7)
NEUTROPHILS NFR BLD AUTO: 59.3 % (ref 42.7–76)
NRBC BLD AUTO-RTO: 0 /100 WBC (ref 0–0.2)
PLATELET # BLD AUTO: 150 10*3/MM3 (ref 140–450)
PMV BLD AUTO: 10.3 FL (ref 6–12)
POTASSIUM SERPL-SCNC: 4.3 MMOL/L (ref 3.5–5.2)
RBC # BLD AUTO: 3.84 10*6/MM3 (ref 4.14–5.8)
SODIUM SERPL-SCNC: 135 MMOL/L (ref 136–145)
UFH PPP CHRO-ACNC: 0.49 IU/ML (ref 0.3–0.7)
UFH PPP CHRO-ACNC: 0.6 IU/ML (ref 0.3–0.7)
UFH PPP CHRO-ACNC: 0.7 IU/ML (ref 0.3–0.7)
WBC NRBC COR # BLD AUTO: 6.58 10*3/MM3 (ref 3.4–10.8)

## 2024-10-05 PROCEDURE — 85025 COMPLETE CBC W/AUTO DIFF WBC: CPT | Performed by: STUDENT IN AN ORGANIZED HEALTH CARE EDUCATION/TRAINING PROGRAM

## 2024-10-05 PROCEDURE — G0378 HOSPITAL OBSERVATION PER HR: HCPCS

## 2024-10-05 PROCEDURE — 85520 HEPARIN ASSAY: CPT

## 2024-10-05 PROCEDURE — 93306 TTE W/DOPPLER COMPLETE: CPT | Performed by: INTERNAL MEDICINE

## 2024-10-05 PROCEDURE — 99232 SBSQ HOSP IP/OBS MODERATE 35: CPT | Performed by: INTERNAL MEDICINE

## 2024-10-05 PROCEDURE — 25010000002 MORPHINE PER 10 MG: Performed by: STUDENT IN AN ORGANIZED HEALTH CARE EDUCATION/TRAINING PROGRAM

## 2024-10-05 PROCEDURE — 93970 EXTREMITY STUDY: CPT

## 2024-10-05 PROCEDURE — 25010000002 HEPARIN (PORCINE) 25000-0.45 UT/250ML-% SOLUTION

## 2024-10-05 PROCEDURE — 93970 EXTREMITY STUDY: CPT | Performed by: INTERNAL MEDICINE

## 2024-10-05 PROCEDURE — 93306 TTE W/DOPPLER COMPLETE: CPT

## 2024-10-05 PROCEDURE — 80048 BASIC METABOLIC PNL TOTAL CA: CPT | Performed by: STUDENT IN AN ORGANIZED HEALTH CARE EDUCATION/TRAINING PROGRAM

## 2024-10-05 RX ORDER — TAMSULOSIN HYDROCHLORIDE 0.4 MG/1
0.4 CAPSULE ORAL DAILY
Status: DISCONTINUED | OUTPATIENT
Start: 2024-10-05 | End: 2024-10-06 | Stop reason: HOSPADM

## 2024-10-05 RX ORDER — OXYCODONE HYDROCHLORIDE 5 MG/1
5 TABLET ORAL EVERY 4 HOURS PRN
Status: DISCONTINUED | OUTPATIENT
Start: 2024-10-05 | End: 2024-10-06 | Stop reason: HOSPADM

## 2024-10-05 RX ORDER — ACETAMINOPHEN 500 MG
1000 TABLET ORAL EVERY 8 HOURS
Status: DISCONTINUED | OUTPATIENT
Start: 2024-10-05 | End: 2024-10-06 | Stop reason: HOSPADM

## 2024-10-05 RX ORDER — DOXYCYCLINE 100 MG/1
100 CAPSULE ORAL EVERY 12 HOURS SCHEDULED
Status: DISCONTINUED | OUTPATIENT
Start: 2024-10-05 | End: 2024-10-06 | Stop reason: HOSPADM

## 2024-10-05 RX ORDER — PRAVASTATIN SODIUM 40 MG
40 TABLET ORAL NIGHTLY
Status: DISCONTINUED | OUTPATIENT
Start: 2024-10-05 | End: 2024-10-06 | Stop reason: HOSPADM

## 2024-10-05 RX ADMIN — TAMSULOSIN HYDROCHLORIDE 0.4 MG: 0.4 CAPSULE ORAL at 12:15

## 2024-10-05 RX ADMIN — OXYCODONE 5 MG: 5 TABLET ORAL at 20:22

## 2024-10-05 RX ADMIN — PRAVASTATIN SODIUM 40 MG: 40 TABLET ORAL at 20:22

## 2024-10-05 RX ADMIN — Medication 10 ML: at 20:22

## 2024-10-05 RX ADMIN — ACETAMINOPHEN 1000 MG: 500 TABLET ORAL at 20:22

## 2024-10-05 RX ADMIN — MORPHINE SULFATE 2 MG: 2 INJECTION, SOLUTION INTRAMUSCULAR; INTRAVENOUS at 07:32

## 2024-10-05 RX ADMIN — DOXYCYCLINE 100 MG: 100 CAPSULE ORAL at 20:22

## 2024-10-05 RX ADMIN — ACETAMINOPHEN 1000 MG: 500 TABLET ORAL at 12:14

## 2024-10-05 RX ADMIN — OXYCODONE 5 MG: 5 TABLET ORAL at 12:14

## 2024-10-05 RX ADMIN — MORPHINE SULFATE 2 MG: 2 INJECTION, SOLUTION INTRAMUSCULAR; INTRAVENOUS at 02:50

## 2024-10-05 RX ADMIN — Medication 10 ML: at 08:19

## 2024-10-05 RX ADMIN — HEPARIN SODIUM 17 UNITS/KG/HR: 10000 INJECTION, SOLUTION INTRAVENOUS at 14:53

## 2024-10-05 RX ADMIN — DOXYCYCLINE 100 MG: 100 CAPSULE ORAL at 12:14

## 2024-10-05 NOTE — PLAN OF CARE
Goal Outcome Evaluation:      A&Ox4. RA. VSS. Echo done. LE duplex pending. Safety maintained. OOB to chair and ambulating in halls. Heparin gtt running and therapeutic.

## 2024-10-05 NOTE — PROGRESS NOTES
[facility] Medicine Services  PROGRESS NOTE    Patient Name: Elliot Jean Baptiste  : 1950  MRN: [id number]    Date of Admission: 10/4/2024  Primary Care Physician: Dandy Teran DO    Subjective   Subjective     CC:  Right flank pain    HPI:  No acute events. Continues with pain in the right side. Worse with deep inspiration. Encouraged mobilization.       Objective   Objective     Vital Signs:   Temp:  [97.2 °F (36.2 °C)-98.8 °F (37.1 °C)] 98.6 °F (37 °C)  Heart Rate:  [55-78] 63  Resp:  [18] 18  BP: (127-160)/(64-80) 127/71     Physical Exam:  Constitutional: No acute distress, awake, alert  Respiratory: Clear to auscultation bilaterally, respiratory effort normal; limited due to pain   Cardiovascular: RRR, no murmurs  Gastrointestinal: Positive bowel sounds, soft, nontender, nondistended; no RUQ tenderness   Musculoskeletal: No bilateral ankle edema  Psychiatric: Appropriate affect, cooperative  Neurologic: Oriented x 3, strength symmetric in all extremities, Cranial Nerves grossly intact to confrontation, speech clear      Results Reviewed:  LAB RESULTS:      Lab 10/05/24  1305 10/05/24  0246 10/04/24  2139 10/04/24  2036 10/04/24  1351 10/01/24  1310   WBC  --  6.58 7.33  --  9.14 5.81   HEMOGLOBIN  --  11.5* 12.2*  --  13.0 14.1   HEMATOCRIT  --  33.1* 35.6*  --  37.5 41.1   PLATELETS  --  150 158  --  158 200   NEUTROS ABS  --  3.90 4.61  --  7.53* 2.99   IMMATURE GRANS (ABS)  --  0.04 0.03  --  0.09* 0.03   LYMPHS ABS  --  1.73 1.79  --  0.73 2.07   MONOS ABS  --  0.68 0.71  --  0.69 0.43   EOS ABS  --  0.19 0.16  --  0.06 0.24   MCV  --  86.2 87.9  --  87.6 88.2   LACTATE  --   --   --   --  1.0  --    PROTIME  --   --   --  14.8*  --   --    APTT  --   --   --  36.3*  --   --    HEPARIN ANTI-XA 0.60 0.70  --  0.10*  --   --    D DIMER QUANT  --   --   --   --  2.68*  --    HSTROP T  --   --  15 16  --   --          Lab 10/05/24  0246 10/04/24  6219 10/04/24  4953  10/01/24  1310   SODIUM 135* 136 136 141   POTASSIUM 4.3 4.5 4.7 4.8   CHLORIDE 102 102 102 105   CO2 23.0 23.0 23.0 23.5   ANION GAP 10.0 11.0 11.0 12.5   BUN 24* 25* 25* 32*   CREATININE 1.65* 1.57* 1.79* 1.78*   EGFR 42.8* 45.4* 38.8* 39.0*   GLUCOSE 120* 119* 125* 112*   CALCIUM 8.7 9.5 9.5 10.2   HEMOGLOBIN A1C  --   --   --  5.90*   TSH  --   --   --  2.540         Lab 10/04/24  1351 10/01/24  1310   TOTAL PROTEIN 7.6 7.5   ALBUMIN 4.2 4.5   GLOBULIN 3.4 3.0   ALT (SGPT) 20 18   AST (SGOT) 23 23   BILIRUBIN 3.6* 2.3*   ALK PHOS 87 82   LIPASE 21  --          Lab 10/04/24  2139 10/04/24  2036   PROBNP  --  304.5   HSTROP T 15 16   PROTIME  --  14.8*   INR  --  1.14*         Lab 10/01/24  1310   CHOLESTEROL 154   LDL CHOL 82   HDL CHOL 49   TRIGLYCERIDES 131             Brief Urine Lab Results  (Last result in the past 365 days)        Color   Clarity   Blood   Leuk Est   Nitrite   Protein   CREAT   Urine HCG        10/04/24 1346 Yellow   Clear   Negative   Negative   Negative   Trace                   Microbiology Results Abnormal       None            Adult Transthoracic Echo Complete W/ Cont if Necessary Per Protocol    Result Date: 10/5/2024    Left ventricular systolic function is normal. Calculated left ventricular EF = 61.1%   There is calcification of the aortic valve.   Estimated right ventricular systolic pressure from tricuspid regurgitation is normal (<35 mmHg).     CT Angiogram Chest    Result Date: 10/4/2024  CT ANGIOGRAM CHEST Date of Exam: 10/4/2024 6:31 PM EDT Indication: right flank/inspirtory pain with pos d dimer. Comparison: None available. Technique: CTA of the chest was performed after the uneventful intravenous administration of 80 cc Isovue-370 IV contrast. . Reconstructed coronal and sagittal images were also obtained. In addition, a 3-D volume rendered image was created for interpretation. Automated exposure control and iterative reconstruction methods were used. Findings: The thyroid  gland is normal. The subglottic airway is clear. Right lower lobe pulmonary embolus with the proximal end in the segmental branch of the right lower lobe. Left upper lobe pulmonary nodule with the proximal embolus within the lingular branch of the left upper lobe. No consolidation. Atelectatic changes at the right lung base. Trace right effusion. The heart and pericardium are normal. Severe atheromatous disease of the coronary vessels. No adenopathy. Prior cholecystectomy. The visualized upper abdomen is otherwise normal. No rib fractures are appreciated.     Impression: Segmental branch bilateral pulmonary emboli. Findings were discussed with Dr. Waterman at 6:44 p.m. on 10/4/2024 Electronically Signed: River Tompkins MD  10/4/2024 6:46 PM EDT  Workstation ID: [id number]    CT Abdomen Pelvis Without Contrast    Result Date: 10/4/2024  CT ABDOMEN PELVIS WO CONTRAST Date of Exam: 10/4/2024 3:42 PM EDT Indication: right flank pain. Comparison: 10/13/2023 Technique: Axial CT images were obtained of the abdomen and pelvis without the administration of contrast. Reconstructed coronal and sagittal images were also obtained. Automated exposure control and iterative construction methods were used. FINDINGS: Lung bases: Right lower lobe calcified granuloma. Atelectatic changes at the right lung base. Trace right effusion. Liver:No masses. No intrahepatic biliary ductal dilatation. Spleen:No masses. No perisplenic hematoma. Pancreas:No pancreatic masses. No evidence of pancreatitis. Gallbladder and common bile duct: Prior cholecystectomy. Adrenal glands:No adrenal masses Kidneys and ureters: Rounded 1.6 cm isodensity arising from the lateral right kidney again noted compared with 10/13/2023 not significantly changed in size. No calculi present within the ureters. Normal caliber ureters. Urinary bladder:No urinary bladder wall thickening. No bladder masses. Small bowel:Normal caliber small bowel. Large bowel:No diverticulosis or  diverticulitis. No large bowel masses are appreciated Appendix: Not seen however there is no evidence of appendicitis GENITOURINARY: Normal prostate Ascites or pneumoperitoneum:None. Adenopathy:None present Osseous structures: The proximal femurs are intact. Degenerative changes of the hips. The sacroiliac joints are normal. Degenerative changes of the lumbar spine. The spinous and transverse processes are intact. Other findings: None     Impression: No renal or ureteric calculi. Stable exophytic right renal cyst. Atelectatic changes at the right lung base. No rib fractures. Trace right effusion Electronically Signed: River Tompkins MD  10/4/2024 3:50 PM EDT  Workstation ID: [id number]     Results for orders placed during the hospital encounter of 10/04/24    Adult Transthoracic Echo Complete W/ Cont if Necessary Per Protocol    Interpretation Summary    Left ventricular systolic function is normal. Calculated left ventricular EF = 61.1%    There is calcification of the aortic valve.    Estimated right ventricular systolic pressure from tricuspid regurgitation is normal (<35 mmHg).      Current medications:  Scheduled Meds:acetaminophen, 1,000 mg, Oral, Q8H  doxycycline, 100 mg, Oral, Q12H  pravastatin, 40 mg, Oral, Nightly  sodium chloride, 10 mL, Intravenous, Q12H  tamsulosin, 0.4 mg, Oral, Daily      Continuous Infusions:heparin, 17 Units/kg/hr, Last Rate: 17 Units/kg/hr (10/05/24 1140)  Pharmacy to Dose Heparin,       PRN Meds:.  senna-docusate sodium **AND** polyethylene glycol **AND** bisacodyl **AND** bisacodyl    Morphine    nitroglycerin    oxyCODONE    Pharmacy to Dose Heparin    Sodium Chloride (PF)    sodium chloride    Assessment & Plan   Assessment & Plan     Active Hospital Problems    Diagnosis  POA    **Bilateral pulmonary embolism [I26.99]  Yes      Resolved Hospital Problems   No resolved problems to display.        Brief Hospital Course to date:  Elliot Jean Baptiste is a 74 y.o. male with history of  BPH, kidney stones, HLD who presented with right flank pain and nausea. States that 1 day prior to admission he developed sudden onset of right flank pain which progressed and caused nausea/vomiting. CT abd /pel with no renal or ureteric calculi; stable exophytic right renal cyst. CTA chest with right lower lobe pulm embolus and ligular branch of the left upper lobe. Patient was started on a heparin gtt.     Acute bilateral PE   - Recent travel in August. Leg cramps but no swelling  - Continue heparin gtt   - Hypercoagulable workup sent on admission   - ECHO with EF 61%; normal right systolic function  - Duplex pending  - Suspect pain is related to right lower lobe PE. PRN pain control.     Recent UTI  - Started on doxy per urology. Continue     HLD  - Statin     Expected Discharge Location and Transportation: home  Expected Discharge   Expected discharge date/ time has not been documented.     VTE Prophylaxis:  Pharmacologic & mechanical VTE prophylaxis orders are present.         AM-PAC 6 Clicks Score (PT): 24 (10/04/24 7047)    CODE STATUS:   Code Status and Medical Interventions: CPR (Attempt to Resuscitate); Full Support   Ordered at: 10/04/24 1903     Code Status (Patient has no pulse and is not breathing):    CPR (Attempt to Resuscitate)     Medical Interventions (Patient has pulse or is breathing):    Full Support       Maya Dominguez,   10/05/24

## 2024-10-05 NOTE — PROGRESS NOTES
Pharmacy to Dose Heparin Infusion Note    Elliot Jean Baptiste is a 74 y.o. male receiving heparin infusion.     Therapy for (VTE/Cardiac): VTE  Patient Weight: 66.7kg  Initial Bolus (Y/N): Y  Any Bolus (Y/N): Y     Signs or Symptoms of Bleeding: N/A    VTE (PE/DVT)   Initial Bolus: 80 units/kg (Max 10,000 units)  Initial rate: 18 units/kg/hr (Max 1,500 units/hr)    Anti-Xa Bolus   Dose Infusion Hold   Time Infusion Rate Change (units/kg/hr) Repeat  Anti-Xa   < 0.11 50 units/kg  (4000 units Max) None Increase by  4 units/kg/hr 6 hours   0.11 - 0.19 25 units/kg  (2000 units Max) None Increase by  3 units/kg/hr 6 hours   0.2 - 0.29 0 None Increase by  2 units/kg/hr 6 hours   0.3 - 0.7 0 None No Change 6 hours (after 2 consecutive levels in range check qAM)   0.71 - 0.8 0 None Decrease by  1 units/kg/hr 6 hours   0.81 - 0.9 0 None Decrease by  2 units/kg/hr 6 hours   0.91 - 1 0 60 minutes Decrease by  3 units/kg/hr 6 hours   >1 0 Hold  After Anti-Xa less than 0.7 decrease previous rate by  4 units/kg/hr  Every 2 hours until Anti-Xa less than 0.7 then when infusion restarts in 6 hours     Results from last 7 days   Lab Units 10/04/24  1351 10/01/24  1310   HEMOGLOBIN g/dL 13.0 14.1   HEMATOCRIT % 37.5 41.1   PLATELETS 10*3/mm3 158 200       Date   Time   Anti-Xa Current Rate (units/kg/hr) Bolus   (units) Rate Change   (units/kg/hr) New Rate (units/kg/hr) Repeat  Anti-Xa Comments /  Pump Check    10/4   NEW START 5340 +18 18 0300 DW DOMINIC [facility]  10/4/2024  20:09 EDT

## 2024-10-05 NOTE — PLAN OF CARE
Goal Outcome Evaluation:     Problem: Adult Inpatient Plan of Care  Goal: Plan of Care Review  Outcome: Progressing  Goal: Patient-Specific Goal (Individualized)  Outcome: Progressing  Goal: Absence of Hospital-Acquired Illness or Injury  Outcome: Progressing  Intervention: Identify and Manage Fall Risk  Recent Flowsheet Documentation  Taken 10/4/2024 2129 by Nick Logan RN  Safety Promotion/Fall Prevention:   activity supervised   assistive device/personal items within reach   clutter free environment maintained   nonskid shoes/slippers when out of bed   safety round/check completed  Intervention: Prevent Skin Injury  Recent Flowsheet Documentation  Taken 10/4/2024 2129 by Nick Logan RN  Body Position: position changed independently  Intervention: Prevent and Manage VTE (Venous Thromboembolism) Risk  Recent Flowsheet Documentation  Taken 10/4/2024 2129 by Nick Logan RN  Activity Management: ambulated in room  VTE Prevention/Management: (see mar) other (see comments)  Goal: Optimal Comfort and Wellbeing  Outcome: Progressing  Goal: Readiness for Transition of Care  Outcome: Progressing  Intervention: Mutually Develop Transition Plan  Recent Flowsheet Documentation  Taken 10/4/2024 2327 by Nick Logan RN  Equipment Currently Used at Home: none  Transportation Anticipated: family or friend will provide  Patient/Family Anticipated Services at Transition: none  Patient/Family Anticipates Transition to: home with family

## 2024-10-05 NOTE — PROGRESS NOTES
Pharmacy to Dose Heparin Infusion Note    Elliot Jean Baptiste is a 74 y.o. male receiving heparin infusion.     Therapy for (VTE/Cardiac): VTE  Patient Weight: 66.7 kg  Initial Bolus (Y/N): Y  Any Bolus (Y/N): Y     Signs or Symptoms of Bleeding: None    VTE (PE/DVT)   Initial Bolus: 80 units/kg (Max 10,000 units)  Initial rate: 18 units/kg/hr (Max 1,500 units/hr)    Anti-Xa Bolus   Dose Infusion Hold   Time Infusion Rate Change (units/kg/hr) Repeat  Anti-Xa   < 0.11 50 units/kg  (4000 units Max) None Increase by  4 units/kg/hr 6 hours   0.11 - 0.19 25 units/kg  (2000 units Max) None Increase by  3 units/kg/hr 6 hours   0.2 - 0.29 0 None Increase by  2 units/kg/hr 6 hours   0.3 - 0.7 0 None No Change 6 hours (after 2 consecutive levels in range check qAM)   0.71 - 0.8 0 None Decrease by  1 units/kg/hr 6 hours   0.81 - 0.9 0 None Decrease by  2 units/kg/hr 6 hours   0.91 - 1 0 60 minutes Decrease by  3 units/kg/hr 6 hours   >1 0 Hold  After Anti-Xa less than 0.7 decrease previous rate by  4 units/kg/hr  Every 2 hours until Anti-Xa less than 0.7 then when infusion restarts in 6 hours     Results from last 7 days   Lab Units 10/05/24  0246 10/04/24  2139 10/04/24  2036 10/04/24  1351   INR   --   --  1.14*  --    HEMOGLOBIN g/dL 11.5* 12.2*  --  13.0   HEMATOCRIT % 33.1* 35.6*  --  37.5   PLATELETS 10*3/mm3 150 158  --  158       Date   Time   Anti-Xa Current Rate (units/kg/hr) Bolus   (units) Rate Change   (units/kg/hr) New Rate (units/kg/hr) Repeat  Anti-Xa Comments /  Pump Check    10/4 2100 0.1 NEW START 5340 +18 18 0300 DW RN   10/5 0246 0.7 18 -- -1 17 1000 Discussed w/ nurse   10/5 1305 0.6 17 -- -- 17 2100 DW DOMINIC Sandoval, [facility]  10/5/2024  14:38 EDT

## 2024-10-06 ENCOUNTER — READMISSION MANAGEMENT (OUTPATIENT)
Dept: CALL CENTER | Facility: HOSPITAL | Age: 74
End: 2024-10-06
Payer: MEDICARE

## 2024-10-06 VITALS
BODY MASS INDEX: 29.68 KG/M2 | WEIGHT: 151.2 LBS | DIASTOLIC BLOOD PRESSURE: 64 MMHG | HEIGHT: 60 IN | SYSTOLIC BLOOD PRESSURE: 124 MMHG | HEART RATE: 58 BPM | TEMPERATURE: 95.8 F | RESPIRATION RATE: 18 BRPM | OXYGEN SATURATION: 92 %

## 2024-10-06 PROBLEM — N39.0 ACUTE UTI (URINARY TRACT INFECTION): Status: RESOLVED | Noted: 2024-10-05 | Resolved: 2024-10-06

## 2024-10-06 PROBLEM — I82.403 DVT, BILATERAL LOWER LIMBS: Status: ACTIVE | Noted: 2024-10-06

## 2024-10-06 LAB
ANION GAP SERPL CALCULATED.3IONS-SCNC: 9 MMOL/L (ref 5–15)
BASOPHILS # BLD AUTO: 0.04 10*3/MM3 (ref 0–0.2)
BASOPHILS NFR BLD AUTO: 0.8 % (ref 0–1.5)
BH CV LOW VAS LEFT PERONEAL VESSEL: 1
BH CV LOW VAS LEFT POPLITEAL SPONT: 1
BH CV LOW VAS RIGHT GASTRONEMIUS VESSEL: 1
BH CV LOWER VASCULAR LEFT COMMON FEMORAL AUGMENT: NORMAL
BH CV LOWER VASCULAR LEFT COMMON FEMORAL COMPRESS: NORMAL
BH CV LOWER VASCULAR LEFT COMMON FEMORAL PHASIC: NORMAL
BH CV LOWER VASCULAR LEFT COMMON FEMORAL SPONT: NORMAL
BH CV LOWER VASCULAR LEFT DISTAL FEMORAL AUGMENT: NORMAL
BH CV LOWER VASCULAR LEFT DISTAL FEMORAL COMPRESS: NORMAL
BH CV LOWER VASCULAR LEFT DISTAL FEMORAL PHASIC: NORMAL
BH CV LOWER VASCULAR LEFT DISTAL FEMORAL SPONT: NORMAL
BH CV LOWER VASCULAR LEFT GASTRONEMIUS COMPRESS: NORMAL
BH CV LOWER VASCULAR LEFT GREATER SAPH AK COMPRESS: NORMAL
BH CV LOWER VASCULAR LEFT GREATER SAPH BK COMPRESS: NORMAL
BH CV LOWER VASCULAR LEFT LESSER SAPH COMPRESS: NORMAL
BH CV LOWER VASCULAR LEFT MID FEMORAL AUGMENT: NORMAL
BH CV LOWER VASCULAR LEFT MID FEMORAL COMPRESS: NORMAL
BH CV LOWER VASCULAR LEFT MID FEMORAL PHASIC: NORMAL
BH CV LOWER VASCULAR LEFT MID FEMORAL SPONT: NORMAL
BH CV LOWER VASCULAR LEFT PERONEAL COMPRESS: NORMAL
BH CV LOWER VASCULAR LEFT POPLITEAL AUGMENT: NORMAL
BH CV LOWER VASCULAR LEFT POPLITEAL COMPRESS: NORMAL
BH CV LOWER VASCULAR LEFT POPLITEAL PHASIC: NORMAL
BH CV LOWER VASCULAR LEFT POPLITEAL SPONT: NORMAL
BH CV LOWER VASCULAR LEFT POSTERIOR TIBIAL COMPRESS: NORMAL
BH CV LOWER VASCULAR LEFT PROFUNDA FEMORAL PHASIC: NORMAL
BH CV LOWER VASCULAR LEFT PROFUNDA FEMORAL SPONT: NORMAL
BH CV LOWER VASCULAR LEFT PROXIMAL FEMORAL AUGMENT: NORMAL
BH CV LOWER VASCULAR LEFT PROXIMAL FEMORAL COMPRESS: NORMAL
BH CV LOWER VASCULAR LEFT PROXIMAL FEMORAL PHASIC: NORMAL
BH CV LOWER VASCULAR LEFT PROXIMAL FEMORAL SPONT: NORMAL
BH CV LOWER VASCULAR LEFT SAPHENOFEMORAL JUNCTION AUGMENT: NORMAL
BH CV LOWER VASCULAR LEFT SAPHENOFEMORAL JUNCTION COMPRESS: NORMAL
BH CV LOWER VASCULAR LEFT SAPHENOFEMORAL JUNCTION PHASIC: NORMAL
BH CV LOWER VASCULAR LEFT SAPHENOFEMORAL JUNCTION SPONT: NORMAL
BH CV LOWER VASCULAR RIGHT COMMON FEMORAL AUGMENT: NORMAL
BH CV LOWER VASCULAR RIGHT COMMON FEMORAL COMPRESS: NORMAL
BH CV LOWER VASCULAR RIGHT COMMON FEMORAL PHASIC: NORMAL
BH CV LOWER VASCULAR RIGHT COMMON FEMORAL SPONT: NORMAL
BH CV LOWER VASCULAR RIGHT DISTAL FEMORAL AUGMENT: NORMAL
BH CV LOWER VASCULAR RIGHT DISTAL FEMORAL COMPRESS: NORMAL
BH CV LOWER VASCULAR RIGHT DISTAL FEMORAL PHASIC: NORMAL
BH CV LOWER VASCULAR RIGHT DISTAL FEMORAL SPONT: NORMAL
BH CV LOWER VASCULAR RIGHT GASTRONEMIUS COMPRESS: NORMAL
BH CV LOWER VASCULAR RIGHT GREATER SAPH AK COMPRESS: NORMAL
BH CV LOWER VASCULAR RIGHT GREATER SAPH BK COMPRESS: NORMAL
BH CV LOWER VASCULAR RIGHT LESSER SAPH COMPRESS: NORMAL
BH CV LOWER VASCULAR RIGHT MID FEMORAL AUGMENT: NORMAL
BH CV LOWER VASCULAR RIGHT MID FEMORAL COMPRESS: NORMAL
BH CV LOWER VASCULAR RIGHT MID FEMORAL PHASIC: NORMAL
BH CV LOWER VASCULAR RIGHT MID FEMORAL SPONT: NORMAL
BH CV LOWER VASCULAR RIGHT PERONEAL COMPRESS: NORMAL
BH CV LOWER VASCULAR RIGHT POPLITEAL AUGMENT: NORMAL
BH CV LOWER VASCULAR RIGHT POPLITEAL COMPRESS: NORMAL
BH CV LOWER VASCULAR RIGHT POPLITEAL PHASIC: NORMAL
BH CV LOWER VASCULAR RIGHT POPLITEAL SPONT: NORMAL
BH CV LOWER VASCULAR RIGHT POSTERIOR TIBIAL COMPRESS: NORMAL
BH CV LOWER VASCULAR RIGHT PROFUNDA FEMORAL PHASIC: NORMAL
BH CV LOWER VASCULAR RIGHT PROFUNDA FEMORAL SPONT: NORMAL
BH CV LOWER VASCULAR RIGHT PROXIMAL FEMORAL AUGMENT: NORMAL
BH CV LOWER VASCULAR RIGHT PROXIMAL FEMORAL COMPRESS: NORMAL
BH CV LOWER VASCULAR RIGHT PROXIMAL FEMORAL PHASIC: NORMAL
BH CV LOWER VASCULAR RIGHT PROXIMAL FEMORAL SPONT: NORMAL
BH CV LOWER VASCULAR RIGHT SAPHENOFEMORAL JUNCTION AUGMENT: NORMAL
BH CV LOWER VASCULAR RIGHT SAPHENOFEMORAL JUNCTION COMPRESS: NORMAL
BH CV LOWER VASCULAR RIGHT SAPHENOFEMORAL JUNCTION PHASIC: NORMAL
BH CV LOWER VASCULAR RIGHT SAPHENOFEMORAL JUNCTION SPONT: NORMAL
BH CV VAS PRELIMINARY FINDINGS SCRIPTING: 1
BUN SERPL-MCNC: 28 MG/DL (ref 8–23)
BUN/CREAT SERPL: 15.6 (ref 7–25)
CALCIUM SPEC-SCNC: 9.2 MG/DL (ref 8.6–10.5)
CHLORIDE SERPL-SCNC: 105 MMOL/L (ref 98–107)
CO2 SERPL-SCNC: 24 MMOL/L (ref 22–29)
CREAT SERPL-MCNC: 1.79 MG/DL (ref 0.76–1.27)
DEPRECATED RDW RBC AUTO: 39.3 FL (ref 37–54)
EGFRCR SERPLBLD CKD-EPI 2021: 38.8 ML/MIN/1.73
EOSINOPHIL # BLD AUTO: 0.33 10*3/MM3 (ref 0–0.4)
EOSINOPHIL NFR BLD AUTO: 6.3 % (ref 0.3–6.2)
ERYTHROCYTE [DISTWIDTH] IN BLOOD BY AUTOMATED COUNT: 12.3 % (ref 12.3–15.4)
GLUCOSE SERPL-MCNC: 111 MG/DL (ref 65–99)
HCT VFR BLD AUTO: 33.8 % (ref 37.5–51)
HGB BLD-MCNC: 11.4 G/DL (ref 13–17.7)
IMM GRANULOCYTES # BLD AUTO: 0.04 10*3/MM3 (ref 0–0.05)
IMM GRANULOCYTES NFR BLD AUTO: 0.8 % (ref 0–0.5)
LYMPHOCYTES # BLD AUTO: 1.87 10*3/MM3 (ref 0.7–3.1)
LYMPHOCYTES NFR BLD AUTO: 35.8 % (ref 19.6–45.3)
MCH RBC QN AUTO: 29.8 PG (ref 26.6–33)
MCHC RBC AUTO-ENTMCNC: 33.7 G/DL (ref 31.5–35.7)
MCV RBC AUTO: 88.5 FL (ref 79–97)
MONOCYTES # BLD AUTO: 0.56 10*3/MM3 (ref 0.1–0.9)
MONOCYTES NFR BLD AUTO: 10.7 % (ref 5–12)
NEUTROPHILS NFR BLD AUTO: 2.38 10*3/MM3 (ref 1.7–7)
NEUTROPHILS NFR BLD AUTO: 45.6 % (ref 42.7–76)
NRBC BLD AUTO-RTO: 0 /100 WBC (ref 0–0.2)
PLATELET # BLD AUTO: 171 10*3/MM3 (ref 140–450)
PMV BLD AUTO: 10.4 FL (ref 6–12)
POTASSIUM SERPL-SCNC: 4.7 MMOL/L (ref 3.5–5.2)
RBC # BLD AUTO: 3.82 10*6/MM3 (ref 4.14–5.8)
SODIUM SERPL-SCNC: 138 MMOL/L (ref 136–145)
UFH PPP CHRO-ACNC: 0.61 IU/ML (ref 0.3–0.7)
WBC NRBC COR # BLD AUTO: 5.22 10*3/MM3 (ref 3.4–10.8)

## 2024-10-06 PROCEDURE — 85025 COMPLETE CBC W/AUTO DIFF WBC: CPT | Performed by: STUDENT IN AN ORGANIZED HEALTH CARE EDUCATION/TRAINING PROGRAM

## 2024-10-06 PROCEDURE — 99239 HOSP IP/OBS DSCHRG MGMT >30: CPT | Performed by: INTERNAL MEDICINE

## 2024-10-06 PROCEDURE — 85520 HEPARIN ASSAY: CPT

## 2024-10-06 PROCEDURE — 80048 BASIC METABOLIC PNL TOTAL CA: CPT | Performed by: STUDENT IN AN ORGANIZED HEALTH CARE EDUCATION/TRAINING PROGRAM

## 2024-10-06 RX ORDER — OXYCODONE HYDROCHLORIDE 5 MG/1
5 TABLET ORAL EVERY 8 HOURS PRN
Qty: 12 TABLET | Refills: 0 | Status: SHIPPED | OUTPATIENT
Start: 2024-10-06

## 2024-10-06 RX ADMIN — APIXABAN 10 MG: 5 TABLET, FILM COATED ORAL at 09:00

## 2024-10-06 RX ADMIN — DOXYCYCLINE 100 MG: 100 CAPSULE ORAL at 09:00

## 2024-10-06 RX ADMIN — TAMSULOSIN HYDROCHLORIDE 0.4 MG: 0.4 CAPSULE ORAL at 09:00

## 2024-10-06 RX ADMIN — ACETAMINOPHEN 1000 MG: 500 TABLET ORAL at 05:32

## 2024-10-06 RX ADMIN — OXYCODONE 5 MG: 5 TABLET ORAL at 09:04

## 2024-10-06 RX ADMIN — Medication 10 ML: at 09:01

## 2024-10-06 NOTE — PROGRESS NOTES
Pharmacy to Dose Heparin Infusion Note    Elliot Jean Baptiste is a 74 y.o. male receiving heparin infusion.     Therapy for (VTE/Cardiac): VTE  Patient Weight: 66.7 kg  Initial Bolus (Y/N): Y  Any Bolus (Y/N): Y     Signs or Symptoms of Bleeding: None    VTE (PE/DVT)   Initial Bolus: 80 units/kg (Max 10,000 units)  Initial rate: 18 units/kg/hr (Max 1,500 units/hr)    Anti-Xa Bolus   Dose Infusion Hold   Time Infusion Rate Change (units/kg/hr) Repeat  Anti-Xa   < 0.11 50 units/kg  (4000 units Max) None Increase by  4 units/kg/hr 6 hours   0.11 - 0.19 25 units/kg  (2000 units Max) None Increase by  3 units/kg/hr 6 hours   0.2 - 0.29 0 None Increase by  2 units/kg/hr 6 hours   0.3 - 0.7 0 None No Change 6 hours (after 2 consecutive levels in range check qAM)   0.71 - 0.8 0 None Decrease by  1 units/kg/hr 6 hours   0.81 - 0.9 0 None Decrease by  2 units/kg/hr 6 hours   0.91 - 1 0 60 minutes Decrease by  3 units/kg/hr 6 hours   >1 0 Hold  After Anti-Xa less than 0.7 decrease previous rate by  4 units/kg/hr  Every 2 hours until Anti-Xa less than 0.7 then when infusion restarts in 6 hours     Results from last 7 days   Lab Units 10/06/24  0556 10/05/24  0246 10/04/24  2139 10/04/24  2036   INR   --   --   --  1.14*   HEMOGLOBIN g/dL 11.4* 11.5* 12.2*  --    HEMATOCRIT % 33.8* 33.1* 35.6*  --    PLATELETS 10*3/mm3 171 150 158  --        Date   Time   Anti-Xa Current Rate (units/kg/hr) Bolus   (units) Rate Change   (units/kg/hr) New Rate (units/kg/hr) Repeat  Anti-Xa Comments /  Pump Check    10/4 2100 0.1 NEW START 5340 +18 18 0300 DW RN   10/5 0246 0.7 18 -- -1 17 1000 Discussed w/ nurse   10/5 1305 0.6 17 -- -- 17 2100 DW RN   10/5 2152 0.49 17 -- -- 17 0600 Discussed w/ nurse   10/6 0556 0.61 17 -- -- 17 0600 DW [facility]  10/6/2024  07:52 EDT

## 2024-10-06 NOTE — DISCHARGE SUMMARY
[facility]  DISCHARGE SUMMARY    Patient Name: Elliot Jean Baptiste  : 1950  MRN: [id number]    Date of Admission: 10/4/2024  1:40 PM  Date of Discharge:  10/06/24  Primary Care Physician: Dandy Teran,     Consults       No orders found from 2024 to 10/5/2024.            Hospital Course     Presenting Problem: pleuritic R flank pain     Active Hospital Problems    Diagnosis  POA    **Bilateral pulmonary embolism [I26.99]  Yes    DVT, bilateral lower limbs [I82.403]  No    Benign localized prostatic hyperplasia with lower urinary tract symptoms (LUTS) [N40.1]  Yes      Resolved Hospital Problems    Diagnosis Date Resolved POA    Acute UTI (urinary tract infection) [N39.0] 10/06/2024 Yes          Hospital Course:  Elliot Jean Baptiste is a 74 y.o. male with history of BPH, kidney stones, HLD who presented with right flank pain and nausea. States that 1 day prior to admission he developed sudden onset of right flank pain which progressed and caused nausea/vomiting. CT abd /pel with no renal or ureteric calculi; stable exophytic right renal cyst. CTA chest with right lower lobe pulm embolus and ligular branch of the left upper lobe. Patient was started on a heparin gtt.      Acute bilateral PE   - Recent prolonged travel in August. Leg cramps but no swelling.   - CT per above  - Duplex with bilateral DVTs   - ECHO with EF 61%; normal right systolic function  - Patient was admitted and started on a heparin drip and then transitioned to eliquis. Hypercoagulable workup was sent by admitting provider and pending at time of DC. Suspect DVTs related to prolonged travel causing PE.   - Continue eliquis on DC. Patient was counseled on medication. Short Rx of oxycodone provided. SADIQ reviewed.     Recent UTI  - Started on doxy per urology. Continue plan per urology      HLD  - Statin     Discharge Follow Up Recommendations for outpatient labs/diagnostics:  PCP   Parul 1 week     Day of Discharge     HPI:   No acute events. Pain is improved with tylenol. Did take oxycodone today but has decreased use. Reviewed plans for DC and he is agreeable. Reviewed eliquis risk/benefits.  Answered all questions to the best of my ability.     Vital Signs:   Temp:  [95.8 °F (35.4 °C)-97.9 °F (36.6 °C)] 95.8 °F (35.4 °C)  Heart Rate:  [51-63] 58  Resp:  [16-18] 18  BP: (124-143)/(64-71) 124/64      Physical Exam:  Constitutional: No acute distress, awake, alert  Respiratory: Clear to auscultation bilaterally, respiratory effort normal; able to take deep breaths   Cardiovascular: grant; no murmur   Gastrointestinal: Positive bowel sounds, soft, nontender, nondistended  Musculoskeletal: No bilateral ankle edema  Psychiatric: Appropriate affect, cooperative  Neurologic: Oriented x 3, strength symmetric in all extremities, Cranial Nerves grossly intact to confrontation, speech clear    Pertinent  and/or Most Recent Results     LAB RESULTS:      Lab 10/06/24  0556 10/05/24  2152 10/05/24  1305 10/05/24  0246 10/04/24  2139 10/04/24  2036 10/04/24  1351 10/01/24  1310   WBC 5.22  --   --  6.58 7.33  --  9.14 5.81   HEMOGLOBIN 11.4*  --   --  11.5* 12.2*  --  13.0 14.1   HEMATOCRIT 33.8*  --   --  33.1* 35.6*  --  37.5 41.1   PLATELETS 171  --   --  150 158  --  158 200   NEUTROS ABS 2.38  --   --  3.90 4.61  --  7.53* 2.99   IMMATURE GRANS (ABS) 0.04  --   --  0.04 0.03  --  0.09* 0.03   LYMPHS ABS 1.87  --   --  1.73 1.79  --  0.73 2.07   MONOS ABS 0.56  --   --  0.68 0.71  --  0.69 0.43   EOS ABS 0.33  --   --  0.19 0.16  --  0.06 0.24   MCV 88.5  --   --  86.2 87.9  --  87.6 88.2   LACTATE  --   --   --   --   --   --  1.0  --    PROTIME  --   --   --   --   --  14.8*  --   --    APTT  --   --   --   --   --  36.3*  --   --    HEPARIN ANTI-XA 0.61 0.49 0.60 0.70  --  0.10*  --   --    D DIMER QUANT  --   --   --   --   --   --  2.68*  --          Lab 10/06/24  0556 10/05/24  0246 10/04/24  4737  10/04/24  1351 10/01/24  1310   SODIUM 138 135* 136 136 141   POTASSIUM 4.7 4.3 4.5 4.7 4.8   CHLORIDE 105 102 102 102 105   CO2 24.0 23.0 23.0 23.0 23.5   ANION GAP 9.0 10.0 11.0 11.0 12.5   BUN 28* 24* 25* 25* 32*   CREATININE 1.79* 1.65* 1.57* 1.79* 1.78*   EGFR 38.8* 42.8* 45.4* 38.8* 39.0*   GLUCOSE 111* 120* 119* 125* 112*   CALCIUM 9.2 8.7 9.5 9.5 10.2   HEMOGLOBIN A1C  --   --   --   --  5.90*   TSH  --   --   --   --  2.540         Lab 10/04/24  1351 10/01/24  1310   TOTAL PROTEIN 7.6 7.5   ALBUMIN 4.2 4.5   GLOBULIN 3.4 3.0   ALT (SGPT) 20 18   AST (SGOT) 23 23   BILIRUBIN 3.6* 2.3*   ALK PHOS 87 82   LIPASE 21  --          Lab 10/04/24  2139 10/04/24  2036   PROBNP  --  304.5   HSTROP T 15 16   PROTIME  --  14.8*   INR  --  1.14*         Lab 10/01/24  1310   CHOLESTEROL 154   LDL CHOL 82   HDL CHOL 49   TRIGLYCERIDES 131             Brief Urine Lab Results  (Last result in the past 365 days)        Color   Clarity   Blood   Leuk Est   Nitrite   Protein   CREAT   Urine HCG        10/04/24 1346 Yellow   Clear   Negative   Negative   Negative   Trace                 Microbiology Results (last 10 days)       Procedure Component Value - Date/Time    Urine Culture - Urine, Urine, Clean Catch [[id number]]  (Normal) Collected: 10/01/24 1310    Lab Status: Final result Specimen: Urine, Clean Catch Updated: 10/03/24 1059     Urine Culture No growth            Duplex Venous Lower Extremity - Bilateral CAR    Result Date: 10/6/2024    Acute right lower extremity deep vein thrombosis noted in the gastrocnemius.   Acute left lower extremity deep vein thrombosis noted in the popliteal and peroneal.     Adult Transthoracic Echo Complete W/ Cont if Necessary Per Protocol    Result Date: 10/5/2024    Left ventricular systolic function is normal. Calculated left ventricular EF = 61.1%   There is calcification of the aortic valve.   Estimated right ventricular systolic pressure from tricuspid regurgitation is normal (<35  mmHg).     CT Angiogram Chest    Result Date: 10/4/2024  CT ANGIOGRAM CHEST Date of Exam: 10/4/2024 6:31 PM EDT Indication: right flank/inspirtory pain with pos d dimer. Comparison: None available. Technique: CTA of the chest was performed after the uneventful intravenous administration of 80 cc Isovue-370 IV contrast. . Reconstructed coronal and sagittal images were also obtained. In addition, a 3-D volume rendered image was created for interpretation. Automated exposure control and iterative reconstruction methods were used. Findings: The thyroid gland is normal. The subglottic airway is clear. Right lower lobe pulmonary embolus with the proximal end in the segmental branch of the right lower lobe. Left upper lobe pulmonary nodule with the proximal embolus within the lingular branch of the left upper lobe. No consolidation. Atelectatic changes at the right lung base. Trace right effusion. The heart and pericardium are normal. Severe atheromatous disease of the coronary vessels. No adenopathy. Prior cholecystectomy. The visualized upper abdomen is otherwise normal. No rib fractures are appreciated.     Segmental branch bilateral pulmonary emboli. Findings were discussed with Dr. Waterman at 6:44 p.m. on 10/4/2024 Electronically Signed: River Tompkins MD  10/4/2024 6:46 PM EDT  Workstation ID: [id number]    CT Abdomen Pelvis Without Contrast    Result Date: 10/4/2024  CT ABDOMEN PELVIS WO CONTRAST Date of Exam: 10/4/2024 3:42 PM EDT Indication: right flank pain. Comparison: 10/13/2023 Technique: Axial CT images were obtained of the abdomen and pelvis without the administration of contrast. Reconstructed coronal and sagittal images were also obtained. Automated exposure control and iterative construction methods were used. FINDINGS: Lung bases: Right lower lobe calcified granuloma. Atelectatic changes at the right lung base. Trace right effusion. Liver:No masses. No intrahepatic biliary ductal dilatation. Spleen:No  masses. No perisplenic hematoma. Pancreas:No pancreatic masses. No evidence of pancreatitis. Gallbladder and common bile duct: Prior cholecystectomy. Adrenal glands:No adrenal masses Kidneys and ureters: Rounded 1.6 cm isodensity arising from the lateral right kidney again noted compared with 10/13/2023 not significantly changed in size. No calculi present within the ureters. Normal caliber ureters. Urinary bladder:No urinary bladder wall thickening. No bladder masses. Small bowel:Normal caliber small bowel. Large bowel:No diverticulosis or diverticulitis. No large bowel masses are appreciated Appendix: Not seen however there is no evidence of appendicitis GENITOURINARY: Normal prostate Ascites or pneumoperitoneum:None. Adenopathy:None present Osseous structures: The proximal femurs are intact. Degenerative changes of the hips. The sacroiliac joints are normal. Degenerative changes of the lumbar spine. The spinous and transverse processes are intact. Other findings: None     No renal or ureteric calculi. Stable exophytic right renal cyst. Atelectatic changes at the right lung base. No rib fractures. Trace right effusion Electronically Signed: River Tompkins MD  10/4/2024 3:50 PM EDT  Workstation ID: [id number]     Results for orders placed during the hospital encounter of 10/04/24    Duplex Venous Lower Extremity - Bilateral CAR    Interpretation Summary    Acute right lower extremity deep vein thrombosis noted in the gastrocnemius.    Acute left lower extremity deep vein thrombosis noted in the popliteal and peroneal.      Results for orders placed during the hospital encounter of 10/04/24    Duplex Venous Lower Extremity - Bilateral CAR    Interpretation Summary    Acute right lower extremity deep vein thrombosis noted in the gastrocnemius.    Acute left lower extremity deep vein thrombosis noted in the popliteal and peroneal.      Results for orders placed during the hospital encounter of 10/04/24    Adult  Transthoracic Echo Complete W/ Cont if Necessary Per Protocol    Interpretation Summary    Left ventricular systolic function is normal. Calculated left ventricular EF = 61.1%    There is calcification of the aortic valve.    Estimated right ventricular systolic pressure from tricuspid regurgitation is normal (<35 mmHg).      Plan for Follow-up of Pending Labs/Results:   Pending Labs       Order Current Status    Anticardiolipin Antibody, IgG / M, Qn In process    Antithrombin III In process    Beta-2 Glycoprotein Antibodies In process    Factor 5 Leiden In process    Lupus Anticoagulant In process    Protein C Activity In process    Protein S Functional In process          Discharge Details        Discharge Medications        New Medications        Instructions Start Date   apixaban 5 MG tablet tablet  Commonly known as: ELIQUIS   Take 2 tablets by mouth Every 12 (Twelve) Hours for 7 days, THEN 1 tablet Every 12 (Twelve) Hours for 30 days. Indications: DVT/PE (active thrombosis)   Start Date: October 6, 2024     oxyCODONE 5 MG immediate release tablet  Commonly known as: ROXICODONE   5 mg, Oral, Every 8 Hours PRN             Continue These Medications        Instructions Start Date   chlorhexidine 0.12 % solution  Commonly known as: PERIDEX   SWISH WITH 1/2 OUNCE FOR 30 SECONDS AND SPIT , TWO TIMES A DAY FOLLOWING BRUSHING      doxycycline 100 MG capsule  Commonly known as: VIBRAMYCIN   100 mg, Oral, 2 Times Daily      fluorouracil 5 % cream  Commonly known as: EFUDEX   APPLY A THIN LAYER TO SCALP, TEMPLES, SIDEBBURNS, AND FOREHEAD AT BEDTIME FOR 2 WEEKS. WASH OFF EACH MORNING.      pravastatin 40 MG tablet  Commonly known as: PRAVACHOL   40 mg, Oral, Nightly      tamsulosin 0.4 MG capsule 24 hr capsule  Commonly known as: FLOMAX   1 capsule, Oral, Daily      vitamin D3 125 MCG (5000 UT) capsule capsule   5,000 Units, Oral, Daily      Vitamin D3 50 MCG (2000 UT) capsule   2,000 Units, Oral, Daily               No  Known Allergies      Discharge Disposition:  Home or Self Care    Diet:  Hospital:  Diet Order   Procedures    Diet: Regular/House; Fluid Consistency: Thin (IDDSI 0)       Diet Instructions       Diet: Regular/House Diet; Thin (IDDSI 0)      Discharge Diet: Regular/House Diet    Fluid Consistency: Thin (IDDSI 0)             Activity:  Activity Instructions       Activity as Tolerated              Restrictions or Other Recommendations:         CODE STATUS:    Code Status and Medical Interventions: CPR (Attempt to Resuscitate); Full Support   Ordered at: 10/04/24 1953     Code Status (Patient has no pulse and is not breathing):    CPR (Attempt to Resuscitate)     Medical Interventions (Patient has pulse or is breathing):    Full Support       Future Appointments   Date Time Provider Department Center   10/23/2024  1:50 PM Cheri Dubon MD [facility]       Additional Instructions for the Follow-ups that You Need to Schedule       Discharge Follow-up with PCP   As directed       Currently Documented PCP:    Dandy Teran DO    PCP Phone Number:    [phone]     Follow Up Details: PCP Dr. Teran 1 week                      Maya Dominguez DO  10/06/24      Time Spent on Discharge:  I spent  35 minutes on this discharge activity which included: face-to-face encounter with the patient, reviewing the data in the system, coordination of the care with the nursing staff as well as consultants, documentation, and entering orders.

## 2024-10-06 NOTE — OUTREACH NOTE
Prep Survey      Flowsheet Row Responses   [facility] patient discharged from? [place]   Is LACE score < 7 ? Yes   Eligibility [facility]   Date of Admission 10/04/24   Date of Discharge 10/06/24   Discharge Disposition Home or Self Care   Discharge diagnosis Bilateral pulmonary embolism  Principal problem   Does the patient have one of the following disease processes/diagnoses(primary or secondary)? Other   Does the patient have Home health ordered? No   Is there a DME ordered? No   Prep survey completed? Yes            Ameena HUMPHREY - Registered Nurse

## 2024-10-06 NOTE — PLAN OF CARE
Goal Outcome Evaluation:     Problem: Adult Inpatient Plan of Care  Goal: Plan of Care Review  Outcome: Progressing  Goal: Patient-Specific Goal (Individualized)  Outcome: Progressing  Goal: Absence of Hospital-Acquired Illness or Injury  Outcome: Progressing  Intervention: Identify and Manage Fall Risk  Recent Flowsheet Documentation  Taken 10/5/2024 2022 by Nick Logan RN  Safety Promotion/Fall Prevention:   activity supervised   assistive device/personal items within reach   clutter free environment maintained   nonskid shoes/slippers when out of bed   safety round/check completed  Intervention: Prevent Skin Injury  Recent Flowsheet Documentation  Taken 10/5/2024 2022 by Nick Logan RN  Body Position: position changed independently  Intervention: Prevent and Manage VTE (Venous Thromboembolism) Risk  Recent Flowsheet Documentation  Taken 10/5/2024 2022 by Nick Logan RN  Activity Management: ambulated to bathroom  Goal: Optimal Comfort and Wellbeing  Outcome: Progressing  Goal: Readiness for Transition of Care  Outcome: Progressing     Problem: Pain Acute  Goal: Acceptable Pain Control and Functional Ability  Outcome: Progressing                                               oriented to person, place and time. Grossly normal motor function and sensation, facial cranial nerves grossly normal

## 2024-10-07 ENCOUNTER — TRANSITIONAL CARE MANAGEMENT TELEPHONE ENCOUNTER (OUTPATIENT)
Dept: CALL CENTER | Facility: HOSPITAL | Age: 74
End: 2024-10-07
Payer: MEDICARE

## 2024-10-07 ENCOUNTER — OFFICE VISIT (OUTPATIENT)
Dept: FAMILY MEDICINE CLINIC | Facility: CLINIC | Age: 74
End: 2024-10-07
Payer: MEDICARE

## 2024-10-07 VITALS
SYSTOLIC BLOOD PRESSURE: 152 MMHG | HEIGHT: 60 IN | DIASTOLIC BLOOD PRESSURE: 80 MMHG | BODY MASS INDEX: 30.15 KG/M2 | HEART RATE: 68 BPM | OXYGEN SATURATION: 97 % | WEIGHT: 153.6 LBS

## 2024-10-07 DIAGNOSIS — I82.4Z3 ACUTE DEEP VEIN THROMBOSIS (DVT) OF DISTAL VEIN OF BOTH LOWER EXTREMITIES: ICD-10-CM

## 2024-10-07 DIAGNOSIS — I26.99 BILATERAL PULMONARY EMBOLISM: Primary | ICD-10-CM

## 2024-10-07 LAB
APTT SCREEN TO CONFIRM RATIO: 1.05 RATIO (ref 0–1.34)
AT III ACT/NOR PPP CHRO: 121 % (ref 75–135)
B2 GLYCOPROT1 IGA SER-ACNC: <9 GPI IGA UNITS (ref 0–25)
B2 GLYCOPROT1 IGG SER-ACNC: <9 GPI IGG UNITS (ref 0–20)
B2 GLYCOPROT1 IGM SER-ACNC: <9 GPI IGM UNITS (ref 0–32)
CARDIOLIPIN IGG SER IA-ACNC: <9 GPL U/ML (ref 0–14)
CARDIOLIPIN IGM SER IA-ACNC: <9 MPL U/ML (ref 0–12)
CONFIRM APTT/NORMAL: 33.8 SEC (ref 0–47.6)
F5 GENE MUT ANL BLD/T: NORMAL
LA 2 SCREEN W REFLEX-IMP: NORMAL
PROT C ACT/NOR PPP: 108 % (ref 73–180)
PROT S ACT/NOR PPP: 58 % (ref 63–140)
SCREEN APTT: 40.8 SEC (ref 0–43.5)
SCREEN DRVVT: 37.6 SEC (ref 0–47)
THROMBIN TIME: 14.1 SEC (ref 0–23)

## 2024-10-07 PROCEDURE — 1125F AMNT PAIN NOTED PAIN PRSNT: CPT

## 2024-10-07 PROCEDURE — 1159F MED LIST DOCD IN RCRD: CPT

## 2024-10-07 PROCEDURE — 99495 TRANSJ CARE MGMT MOD F2F 14D: CPT

## 2024-10-07 PROCEDURE — 1160F RVW MEDS BY RX/DR IN RCRD: CPT

## 2024-10-07 PROCEDURE — 1111F DSCHRG MED/CURRENT MED MERGE: CPT

## 2024-10-07 NOTE — OUTREACH NOTE
Call Center TCM Note      Flowsheet Row Responses   [facility] patient discharged from? [place]   Does the patient have one of the following disease processes/diagnoses(primary or secondary)? Other   TCM attempt successful? Yes   Call start time 1434   Call end time 1435   Discharge diagnosis Bilateral pulmonary embolism  Principal problem   Comments Patient at [facility] f/u appt at time of TCM call. this fulfills the TCM requirement, no phone call needed.   Does the patient have an appointment with their PCP within 7-14 days of discharge? Yes   TCM call completed? Yes   Wrap up additional comments Patient at [facility]/ appt at time of TCM call. this fulfills the TCM requirement, no phone call needed.   Call end time 1435   Would this patient benefit from a Referral to [facility] Social Work? No   Is the patient interested in additional calls from an ambulatory ? No            Janiya Isbell RN    10/7/2024, 14:35 EDT

## 2024-10-07 NOTE — PROGRESS NOTES
Transitional Care Follow Up Visit  Subjective     Elliot Jean Baptiste is a 74 y.o. male who presents for a transitional care management visit.    Within 48 business hours after discharge our office contacted him via telephone to coordinate his care and needs.      I reviewed and discussed the details of that call along with the discharge summary, hospital problems, inpatient lab results, inpatient diagnostic studies, and consultation reports with Elliot.     Current outpatient and discharge medications have been reconciled for the patient.  Reviewed by: Hien Valencia PA-C          10/6/2024     3:19 PM   Date of TCM Phone Call   [facility]   Date of Admission 10/4/2024   Date of Discharge 10/6/2024   Discharge Disposition Home or Self Care     Risk for Readmission (LACE) Score: 2 (10/6/2024  6:00 AM)      History of Present Illness  Elliot Jean Baptiste is a 74 year old male with pmhx of insomnia, HTN, kidney stones and BPH with LUTS . Patient recently hospitalized for bilateral pulmonary embolisms and DVT in bilateral lower extremities. Patient presented to ER with worsening right sided flank pain for possible kidney stones. No kidney stones were found on CT of abdomen and pelvis, but on CT chest they did find PE in both lungs. He was admitted to the hospital for work up and underwent venous US of legs. Had a negative Echo of his heart, too.  Patient was started on heparin drip and then switched to Eliquis and discharged yesterday. Prior to these episodes patient had prolonged travel leading to his blood clots. Denies known famiyl hx of clotting disorders, but states one cousin and one aunt on his maternal side both have a hx of blood clots. Denies chest pain, palpitations, dizziness, shortness of breath. Patient states he does feel very tired and fatigued, but has since his discharge. Denies any changes since discharge yesterday. Was told he would continue Eliquis 5 mg BID for at least 6  months. Currently on 10 mg BID for the first 7 days. He has a 1 month supply, but does need refills. He has been working on his deep breathing every hour he is awake with the spirometer and feels this does help.         Course During Hospital Stay:      [facility] Medicine Services  DISCHARGE SUMMARY     Patient Name: Elliot Jean Baptiste  : 1950  MRN: [id number]     Date of Admission: 10/4/2024  1:40 PM  Date of Discharge:  10/06/24  Primary Care Physician: Dandy Teran,      Consults         No orders found from 2024 to 10/5/2024.                Hospital Course      Presenting Problem: pleuritic R flank pain            Active Hospital Problems     Diagnosis   POA    **Bilateral pulmonary embolism [I26.99]   Yes    DVT, bilateral lower limbs [I82.403]   No    Benign localized prostatic hyperplasia with lower urinary tract symptoms (LUTS) [N40.1]   Yes       Resolved Hospital Problems     Diagnosis Date Resolved POA    Acute UTI (urinary tract infection) [N39.0] 10/06/2024 Yes            Hospital Course:  Elliot Jean Baptiste is a 74 y.o. male with history of BPH, kidney stones, HLD who presented with right flank pain and nausea. States that 1 day prior to admission he developed sudden onset of right flank pain which progressed and caused nausea/vomiting. CT abd /pel with no renal or ureteric calculi; stable exophytic right renal cyst. CTA chest with right lower lobe pulm embolus and ligular branch of the left upper lobe. Patient was started on a heparin gtt.      Acute bilateral PE   - Recent prolonged travel in August. Leg cramps but no swelling.   - CT per above  - Duplex with bilateral DVTs   - ECHO with EF 61%; normal right systolic function  - Patient was admitted and started on a heparin drip and then transitioned to eliquis. Hypercoagulable workup was sent by admitting provider and pending at time of DC. Suspect DVTs related to prolonged travel causing PE.   - Continue  eliquis on DC. Patient was counseled on medication. Short Rx of oxycodone provided. SADIQ reviewed.      Recent UTI  - Started on doxy per urology. Continue plan per urology      HLD  - Statin      Discharge Follow Up Recommendations for outpatient labs/diagnostics:  PCP Dr. Teran 1 week      Day of Discharge      HPI:   No acute events. Pain is improved with tylenol. Did take oxycodone today but has decreased use. Reviewed plans for DC and he is agreeable. Reviewed eliquis risk/benefits.  Answered all questions to the best of my ability.      Vital Signs:   Temp:  [95.8 °F (35.4 °C)-97.9 °F (36.6 °C)] 95.8 °F (35.4 °C)  Heart Rate:  [51-63] 58  Resp:  [16-18] 18  BP: (124-143)/(64-71) 124/64        Physical Exam:  Constitutional: No acute distress, awake, alert  Respiratory: Clear to auscultation bilaterally, respiratory effort normal; able to take deep breaths   Cardiovascular: grant; no murmur   Gastrointestinal: Positive bowel sounds, soft, nontender, nondistended  Musculoskeletal: No bilateral ankle edema  Psychiatric: Appropriate affect, cooperative  Neurologic: Oriented x 3, strength symmetric in all extremities, Cranial Nerves grossly intact to confrontation, speech clear     Pertinent  and/or Most Recent Results      LAB RESULTS:                 Lab 10/06/24  0556 10/05/24  2152 10/05/24  1305 10/05/24  0246 10/04/24  2139 10/04/24  2036 10/04/24  1351 10/01/24  1310   WBC 5.22  --   --  6.58 7.33  --  9.14 5.81   HEMOGLOBIN 11.4*  --   --  11.5* 12.2*  --  13.0 14.1   HEMATOCRIT 33.8*  --   --  33.1* 35.6*  --  37.5 41.1   PLATELETS 171  --   --  150 158  --  158 200   NEUTROS ABS 2.38  --   --  3.90 4.61  --  7.53* 2.99   IMMATURE GRANS (ABS) 0.04  --   --  0.04 0.03  --  0.09* 0.03   LYMPHS ABS 1.87  --   --  1.73 1.79  --  0.73 2.07   MONOS ABS 0.56  --   --  0.68 0.71  --  0.69 0.43   EOS ABS 0.33  --   --  0.19 0.16  --  0.06 0.24   MCV 88.5  --   --  86.2 87.9  --  87.6 88.2   LACTATE  --   --   --    --   --   --  1.0  --    PROTIME  --   --   --   --   --  14.8*  --   --    APTT  --   --   --   --   --  36.3*  --   --    HEPARIN ANTI-XA 0.61 0.49 0.60 0.70  --  0.10*  --   --    D DIMER QUANT  --   --   --   --   --   --  2.68*  --                   Lab 10/06/24  0556 10/05/24  0246 10/04/24  2139 10/04/24  1351 10/01/24  1310   SODIUM 138 135* 136 136 141   POTASSIUM 4.7 4.3 4.5 4.7 4.8   CHLORIDE 105 102 102 102 105   CO2 24.0 23.0 23.0 23.0 23.5   ANION GAP 9.0 10.0 11.0 11.0 12.5   BUN 28* 24* 25* 25* 32*   CREATININE 1.79* 1.65* 1.57* 1.79* 1.78*   EGFR 38.8* 42.8* 45.4* 38.8* 39.0*   GLUCOSE 111* 120* 119* 125* 112*   CALCIUM 9.2 8.7 9.5 9.5 10.2   HEMOGLOBIN A1C  --   --   --   --  5.90*   TSH  --   --   --   --  2.540               Lab 10/04/24  1351 10/01/24  1310   TOTAL PROTEIN 7.6 7.5   ALBUMIN 4.2 4.5   GLOBULIN 3.4 3.0   ALT (SGPT) 20 18   AST (SGOT) 23 23   BILIRUBIN 3.6* 2.3*   ALK PHOS 87 82   LIPASE 21  --                Lab 10/04/24  2139 10/04/24  2036   PROBNP  --  304.5   HSTROP T 15 16   PROTIME  --  14.8*   INR  --  1.14*              Lab 10/01/24  1310   CHOLESTEROL 154   LDL CHOL 82   HDL CHOL 49   TRIGLYCERIDES 131              Brief Urine Lab Results  (Last result in the past 365 days)          Color   Clarity   Blood   Leuk Est   Nitrite   Protein   CREAT   Urine HCG         10/04/24 1346 Yellow    Clear    Negative    Negative    Negative    Trace                          Microbiology Results (last 10 days)         Procedure Component Value - Date/Time     Urine Culture - Urine, Urine, Clean Catch [[id number]]  (Normal) Collected: 10/01/24 1310     Lab Status: Final result Specimen: Urine, Clean Catch Updated: 10/03/24 1059       Urine Culture No growth                  Radiology results from the last 10 days:   Duplex Venous Lower Extremity - Bilateral CAR     Result Date: 10/6/2024    Acute right lower extremity deep vein thrombosis noted in the gastrocnemius.   Acute left lower  extremity deep vein thrombosis noted in the popliteal and peroneal.      Adult Transthoracic Echo Complete W/ Cont if Necessary Per Protocol     Result Date: 10/5/2024    Left ventricular systolic function is normal. Calculated left ventricular EF = 61.1%   There is calcification of the aortic valve.   Estimated right ventricular systolic pressure from tricuspid regurgitation is normal (<35 mmHg).      CT Angiogram Chest     Result Date: 10/4/2024  CT ANGIOGRAM CHEST Date of Exam: 10/4/2024 6:31 PM EDT Indication: right flank/inspirtory pain with pos d dimer. Comparison: None available. Technique: CTA of the chest was performed after the uneventful intravenous administration of 80 cc Isovue-370 IV contrast. . Reconstructed coronal and sagittal images were also obtained. In addition, a 3-D volume rendered image was created for interpretation. Automated exposure control and iterative reconstruction methods were used. Findings: The thyroid gland is normal. The subglottic airway is clear. Right lower lobe pulmonary embolus with the proximal end in the segmental branch of the right lower lobe. Left upper lobe pulmonary nodule with the proximal embolus within the lingular branch of the left upper lobe. No consolidation. Atelectatic changes at the right lung base. Trace right effusion. The heart and pericardium are normal. Severe atheromatous disease of the coronary vessels. No adenopathy. Prior cholecystectomy. The visualized upper abdomen is otherwise normal. No rib fractures are appreciated.      Segmental branch bilateral pulmonary emboli. Findings were discussed with Dr. Waterman at 6:44 p.m. on 10/4/2024 Electronically Signed: River Tompkins MD  10/4/2024 6:46 PM EDT  Workstation ID: [id number]     CT Abdomen Pelvis Without Contrast     Result Date: 10/4/2024  CT ABDOMEN PELVIS WO CONTRAST Date of Exam: 10/4/2024 3:42 PM EDT Indication: right flank pain. Comparison: 10/13/2023 Technique: Axial CT images were obtained of  the abdomen and pelvis without the administration of contrast. Reconstructed coronal and sagittal images were also obtained. Automated exposure control and iterative construction methods were used. FINDINGS: Lung bases: Right lower lobe calcified granuloma. Atelectatic changes at the right lung base. Trace right effusion. Liver:No masses. No intrahepatic biliary ductal dilatation. Spleen:No masses. No perisplenic hematoma. Pancreas:No pancreatic masses. No evidence of pancreatitis. Gallbladder and common bile duct: Prior cholecystectomy. Adrenal glands:No adrenal masses Kidneys and ureters: Rounded 1.6 cm isodensity arising from the lateral right kidney again noted compared with 10/13/2023 not significantly changed in size. No calculi present within the ureters. Normal caliber ureters. Urinary bladder:No urinary bladder wall thickening. No bladder masses. Small bowel:Normal caliber small bowel. Large bowel:No diverticulosis or diverticulitis. No large bowel masses are appreciated Appendix: Not seen however there is no evidence of appendicitis GENITOURINARY: Normal prostate Ascites or pneumoperitoneum:None. Adenopathy:None present Osseous structures: The proximal femurs are intact. Degenerative changes of the hips. The sacroiliac joints are normal. Degenerative changes of the lumbar spine. The spinous and transverse processes are intact. Other findings: None      No renal or ureteric calculi. Stable exophytic right renal cyst. Atelectatic changes at the right lung base. No rib fractures. Trace right effusion Electronically Signed: River Tompkins MD  10/4/2024 3:50 PM EDT  Workstation ID: [id number]         Results for orders placed during the hospital encounter of 10/04/24     Duplex Venous Lower Extremity - Bilateral CAR     Interpretation Summary    Acute right lower extremity deep vein thrombosis noted in the gastrocnemius.    Acute left lower extremity deep vein thrombosis noted in the popliteal and peroneal.         Results for orders placed during the hospital encounter of 10/04/24     Duplex Venous Lower Extremity - Bilateral CAR     Interpretation Summary    Acute right lower extremity deep vein thrombosis noted in the gastrocnemius.    Acute left lower extremity deep vein thrombosis noted in the popliteal and peroneal.        Results for orders placed during the hospital encounter of 10/04/24     Adult Transthoracic Echo Complete W/ Cont if Necessary Per Protocol     Interpretation Summary    Left ventricular systolic function is normal. Calculated left ventricular EF = 61.1%    There is calcification of the aortic valve.    Estimated right ventricular systolic pressure from tricuspid regurgitation is normal (<35 mmHg).        Plan for Follow-up of Pending Labs/Results:   Pending Labs         Order Current Status     Anticardiolipin Antibody, IgG / M, Qn In process     Antithrombin III In process     Beta-2 Glycoprotein Antibodies In process     Factor 5 Leiden In process     Lupus Anticoagulant In process     Protein C Activity In process     Protein S Functional In process             Discharge Details          Discharge Medications          New Medications         Instructions Start Date   apixaban 5 MG tablet tablet  Commonly known as: ELIQUIS    Take 2 tablets by mouth Every 12 (Twelve) Hours for 7 days, THEN 1 tablet Every 12 (Twelve) Hours for 30 days. Indications: DVT/PE (active thrombosis)    Start Date: October 6, 2024      oxyCODONE 5 MG immediate release tablet  Commonly known as: ROXICODONE    5 mg, Oral, Every 8 Hours PRN                  Continue These Medications         Instructions Start Date   chlorhexidine 0.12 % solution  Commonly known as: PERIDEX    SWISH WITH 1/2 OUNCE FOR 30 SECONDS AND SPIT , TWO TIMES A DAY FOLLOWING BRUSHING        doxycycline 100 MG capsule  Commonly known as: VIBRAMYCIN    100 mg, Oral, 2 Times Daily        fluorouracil 5 % cream  Commonly known as: EFUDEX    APPLY A THIN  LAYER TO SCALP, TEMPLES, SIDEBBURNS, AND FOREHEAD AT BEDTIME FOR 2 WEEKS. WASH OFF EACH MORNING.        pravastatin 40 MG tablet  Commonly known as: PRAVACHOL    40 mg, Oral, Nightly        tamsulosin 0.4 MG capsule 24 hr capsule  Commonly known as: FLOMAX    1 capsule, Oral, Daily        vitamin D3 125 MCG (5000 UT) capsule capsule    5,000 Units, Oral, Daily        Vitamin D3 50 MCG (2000 UT) capsule    2,000 Units, Oral, Daily                     Allergies   No Known Allergies           Discharge Disposition:  Home or Self Care     Diet:  Hospital:      Diet Order   Procedures    Diet: Regular/House; Fluid Consistency: Thin (IDDSI 0)         Diet Instructions         Diet: Regular/House Diet; Thin (IDDSI 0)       Discharge Diet: Regular/House Diet     Fluid Consistency: Thin (IDDSI 0)                Activity:  Activity Instructions         Activity as Tolerated                  Restrictions or Other Recommendations:          CODE STATUS:        Code Status and Medical Interventions: CPR (Attempt to Resuscitate); Full Support   Ordered at: 10/04/24 1953     Code Status (Patient has no pulse and is not breathing):     CPR (Attempt to Resuscitate)     Medical Interventions (Patient has pulse or is breathing):     Full Support                Future Appointments   Date Time Provider Department Center   10/23/2024  1:50 PM Cheri Dubon MD [facility]         Additional Instructions for the Follow-ups that You Need to Schedule         Discharge Follow-up with PCP   As directed         Currently Documented PCP:    Dandy Teran DO    PCP Phone Number:    [phone]      Follow Up Details: PCP Dr. Teran 1 week                     The following portions of the patient's history were reviewed and updated as appropriate: allergies, current medications, past family history, past medical history, past social history, past surgical history, and problem list.    Review of Systems   Constitutional:  Positive for  "fatigue. Negative for chills, diaphoresis and fever.   Respiratory:  Negative for cough, chest tightness, shortness of breath and wheezing.    Cardiovascular:  Negative for chest pain, palpitations and leg swelling.   Genitourinary:  Negative for dysuria and flank pain.   Neurological:  Negative for dizziness, light-headedness and headaches.       Objective   /80   Pulse 68   Ht 152.4 cm (60\")   Wt 69.7 kg (153 lb 9.6 oz)   SpO2 97%   BMI 30.00 kg/m²   Physical Exam  Vitals reviewed.   Constitutional:       General: He is not in acute distress.     Appearance: Normal appearance. He is not ill-appearing or diaphoretic.   HENT:      Head: Normocephalic and atraumatic.   Eyes:      Pupils: Pupils are equal, round, and reactive to light.   Cardiovascular:      Rate and Rhythm: Normal rate and regular rhythm.      Pulses: Normal pulses.      Heart sounds: Normal heart sounds.   Pulmonary:      Effort: Pulmonary effort is normal. No respiratory distress.      Breath sounds: Normal breath sounds. No wheezing or rhonchi.   Musculoskeletal:         General: No tenderness or signs of injury.      Right lower leg: No edema.      Left lower leg: No edema.   Skin:     General: Skin is warm.   Neurological:      General: No focal deficit present.      Mental Status: He is alert and oriented to person, place, and time.   Psychiatric:         Mood and Affect: Mood normal.         Assessment & Plan   Problems Addressed this Visit       Bilateral pulmonary embolism - Primary    Relevant Medications    apixaban (ELIQUIS) 5 MG tablet tablet (Start on 11/24/2024)    Other Relevant Orders    Ambulatory Referral to Hematology / Oncology    Comprehensive Metabolic Panel    CBC (No Diff)    DVT, bilateral lower limbs    Relevant Medications    apixaban (ELIQUIS) 5 MG tablet tablet (Start on 11/24/2024)    Other Relevant Orders    Ambulatory Referral to Hematology / Oncology     Diagnoses         Codes Comments    Bilateral " pulmonary embolism    -  Primary ICD-10-CM: I26.99  ICD-9-CM: 415.19     Acute deep vein thrombosis (DVT) of distal vein of both lower extremities     ICD-10-CM: I82.4Z3  ICD-9-CM: 453.42           Reviewed imaging with patient.   Patient symptoms are improving and stables today. In no acute distress.  Patient to continue on Eliquis 10 mg BID for the next week and to continue on Eliquis 5 mg BID afterward completing the first week.   Patient given 1 month supply in samples today to start after he completes the original prescription. Refills sent to pharmacy, too.     Referral to Hematology placed for hypercoagulability work up with family hx of blood clots and to discuss length of Eliquis.   Advised patient to make sure to increase hydration and to have blood work redrawn early next week.   Advised that if he begin having chest pain, shortness of breath, leg swelling, redness, cough or hemoptysis or worsening of symptoms to go back to the ER for evaluation.   He verbalized understanding and agreed to plan. He has no further questions at this time.

## 2024-10-21 ENCOUNTER — LAB (OUTPATIENT)
Dept: LAB | Facility: HOSPITAL | Age: 74
End: 2024-10-21
Payer: MEDICARE

## 2024-10-21 DIAGNOSIS — R97.20 ELEVATED PROSTATE SPECIFIC ANTIGEN (PSA): ICD-10-CM

## 2024-10-21 DIAGNOSIS — I26.99 BILATERAL PULMONARY EMBOLISM: ICD-10-CM

## 2024-10-21 LAB
ALBUMIN SERPL-MCNC: 3.9 G/DL (ref 3.5–5.2)
ALBUMIN/GLOB SERPL: 1.3 G/DL
ALP SERPL-CCNC: 75 U/L (ref 39–117)
ALT SERPL W P-5'-P-CCNC: 18 U/L (ref 1–41)
ANION GAP SERPL CALCULATED.3IONS-SCNC: 7.6 MMOL/L (ref 5–15)
AST SERPL-CCNC: 22 U/L (ref 1–40)
BILIRUB SERPL-MCNC: 1.1 MG/DL (ref 0–1.2)
BUN SERPL-MCNC: 22 MG/DL (ref 8–23)
BUN/CREAT SERPL: 12.3 (ref 7–25)
CALCIUM SPEC-SCNC: 9.6 MG/DL (ref 8.6–10.5)
CHLORIDE SERPL-SCNC: 106 MMOL/L (ref 98–107)
CO2 SERPL-SCNC: 24.4 MMOL/L (ref 22–29)
CREAT SERPL-MCNC: 1.79 MG/DL (ref 0.76–1.27)
DEPRECATED RDW RBC AUTO: 39.8 FL (ref 37–54)
EGFRCR SERPLBLD CKD-EPI 2021: 38.8 ML/MIN/1.73
ERYTHROCYTE [DISTWIDTH] IN BLOOD BY AUTOMATED COUNT: 12.3 % (ref 12.3–15.4)
GLOBULIN UR ELPH-MCNC: 3 GM/DL
GLUCOSE SERPL-MCNC: 151 MG/DL (ref 65–99)
HCT VFR BLD AUTO: 36.8 % (ref 37.5–51)
HGB BLD-MCNC: 12.3 G/DL (ref 13–17.7)
MCH RBC QN AUTO: 29.9 PG (ref 26.6–33)
MCHC RBC AUTO-ENTMCNC: 33.4 G/DL (ref 31.5–35.7)
MCV RBC AUTO: 89.3 FL (ref 79–97)
PLATELET # BLD AUTO: 248 10*3/MM3 (ref 140–450)
PMV BLD AUTO: 10.2 FL (ref 6–12)
POTASSIUM SERPL-SCNC: 4.3 MMOL/L (ref 3.5–5.2)
PROT SERPL-MCNC: 6.9 G/DL (ref 6–8.5)
PSA SERPL-MCNC: 7.91 NG/ML (ref 0–4)
RBC # BLD AUTO: 4.12 10*6/MM3 (ref 4.14–5.8)
SODIUM SERPL-SCNC: 138 MMOL/L (ref 136–145)
WBC NRBC COR # BLD AUTO: 4.14 10*3/MM3 (ref 3.4–10.8)

## 2024-10-21 PROCEDURE — 84153 ASSAY OF PSA TOTAL: CPT

## 2024-10-21 PROCEDURE — 85027 COMPLETE CBC AUTOMATED: CPT

## 2024-10-21 PROCEDURE — 36415 COLL VENOUS BLD VENIPUNCTURE: CPT

## 2024-10-21 PROCEDURE — 80053 COMPREHEN METABOLIC PANEL: CPT

## 2024-10-23 ENCOUNTER — OFFICE VISIT (OUTPATIENT)
Dept: UROLOGY | Facility: CLINIC | Age: 74
End: 2024-10-23
Payer: MEDICARE

## 2024-10-23 VITALS — BODY MASS INDEX: 30.17 KG/M2 | HEIGHT: 60 IN | WEIGHT: 153.66 LBS

## 2024-10-23 DIAGNOSIS — R97.20 ELEVATED PROSTATE SPECIFIC ANTIGEN (PSA): Primary | ICD-10-CM

## 2024-10-23 DIAGNOSIS — N41.9 PROSTATITIS, UNSPECIFIED PROSTATITIS TYPE: ICD-10-CM

## 2024-10-23 NOTE — PROGRESS NOTES
[facility] Male Office Visit      Patient Name: Elliot Jean Baptiste  : 1950   MRN: [id number]     Chief Complaint:  elevated PSA    History of Present Illness: Mr. Jean Baptiste is a 74 y.o. male with history of lower urinary tract symptoms.  He underwent a greenlight PVP in 2023.  He did well postoperatively with his next improvement in his storage and voiding systems with an IPSS of 3 earlier this month.  His primary care provider ordered a PSA that revealed a value of 12.  We saw him earlier this month we started him on a 2-week course of doxycycline and he returns today with a repeat PSA check.  In the interim since his last clinic appointment he did present to the ED with right flank pain and was diagnosed with a pulmonary embolism and was started on apixaban.  His PSA on return to clinic today is 7.9.     Lab Results   Component Value Date    PSA 7.910 (H) 10/21/2024    PSA 12.400 (H) 10/01/2024    PSA 4.250 (H) 2019           Subjective      Review of System:   Review of Systems   Genitourinary:  Negative for difficulty urinating, dysuria, flank pain, frequency, hematuria and urgency.      I have reviewed the ROS documented by my clinical staff, I have updated appropriately and I agree. Cheri Dubon MD    Past Medical History:  Past Medical History:   Diagnosis Date    Allergic     Arthritis     Benign prostatic hyperplasia     Cancer     skin    Cataract     Colon polyp     Depression     Enlarged prostate     Gallstones     History of medical problems     poor blood clotting     HL (hearing loss)     Hypertension     Kidney infection     Kidney stone     Migraine     Prostatitis        Past Surgical History:  Past Surgical History:   Procedure Laterality Date    APPENDECTOMY      CATARACT EXTRACTION Bilateral     CHOLECYSTECTOMY      COLONOSCOPY      CYSTOSCOPY      CYSTOSCOPY TRANSURETHRAL RESECTION OF PROSTATE N/A 2023    Procedure: CYSTOSCOPY TRANSURETHRAL RESECTION OF PROSTATE  GREENLIGHT;  Surgeon: Cheri Dubon MD;  Location: [facility];  Service: Urology;  Laterality: N/A;    GALLBLADDER SURGERY      HEMORRHOIDECTOMY      KIDNEY STONE SURGERY      KNEE CARTILAGE SURGERY Left     LITHOTRIPSY  1993    PROSTATE SURGERY  December 7, 2023    ULNAR NERVE REPAIR Right        Medications:    Current Outpatient Medications:     apixaban (ELIQUIS) 5 MG tablet tablet, Take 2 tablets by mouth Every 12 (Twelve) Hours for 7 days, THEN 1 tablet Every 12 (Twelve) Hours for 30 days. Indications: DVT/PE (active thrombosis), Disp: 88 tablet, Rfl: 0    [START ON 11/24/2024] apixaban (ELIQUIS) 5 MG tablet tablet, Take 1 tablet by mouth 2 (Two) Times a Day., Disp: 60 tablet, Rfl: 1    chlorhexidine (PERIDEX) 0.12 % solution, SWISH WITH 1/2 OUNCE FOR 30 SECONDS AND SPIT , TWO TIMES A DAY FOLLOWING BRUSHING, Disp: , Rfl:     Cholecalciferol (VITAMIN D3) 5000 units capsule capsule, Take 1 capsule by mouth Daily., Disp: , Rfl:     fluorouracil (EFUDEX) 5 % cream, APPLY A THIN LAYER TO SCALP, TEMPLES, SIDEBBURNS, AND FOREHEAD AT BEDTIME FOR 2 WEEKS. WASH OFF EACH MORNING., Disp: , Rfl:     pravastatin (PRAVACHOL) 40 MG tablet, Take 1 tablet by mouth Every Night., Disp: 90 tablet, Rfl: 3    tamsulosin (FLOMAX) 0.4 MG capsule 24 hr capsule, Take 1 capsule by mouth Daily., Disp: , Rfl:     Allergies:  Allergies   Allergen Reactions    Doxycycline Diarrhea     Stomach pain, diarrhea       Social History:  Social History     Socioeconomic History    Marital status:    Tobacco Use    Smoking status: Never     Passive exposure: Past    Smokeless tobacco: Never   Vaping Use    Vaping status: Never Used   Substance and Sexual Activity    Alcohol use: Yes     Comment: social occasions    Drug use: No    Sexual activity: Not Currently     Partners: Female       Family History:  Family History   Problem Relation Age of Onset    Cancer Mother         1 kidney removed    Hypertension Mother     Migraines Mother      "Osteoporosis Mother     Kidney cancer Mother         Kidney removed    Hypertension Father     Hyperlipidemia Father         Bypass surgeries, valve repair    Hearing loss Father         work related needed hearing aids    Heart disease Father     Kidney disease Father         loss of kidney function    Vision loss Father         Macular degeneration    Migraines Sister     Stroke Paternal Grandmother          of a stroke       IPSS Questionnaire (AUA-7):  Over the past month…    1)  Incomplete Emptying  How often have you had a sensation of not emptying your bladder?  0 - Not at all   2)  Frequency  How often have you had to urinate less than every two hours? 1 - Less than 1 time in 5   3)  Intermittency  How often have you found you stopped and started again several times when you urinated?  1 - Less than 1 time in 5   4) Urgency  How often have you found it difficult to postpone urination?  0 - Not at all   5) Weak Stream  How often have you had a weak urinary stream?  0 - Not at all   6) Straining  How often have you had to push or strain to begin urination?  0 - Not at all   7) Nocturia  How many times did you typically get up at night to urinate?  1 - 1 time   Total Score:  3   The International Prostate Symptom Score (IPSS) is used to screen, diagnose, track symptoms of benign prostatic hyperplasia (BPH).    0-7 pts (Mild Symptoms)  / 8-19 pts (Moderate) / 20-35 (Severe)    Quality of life due to urinary symptoms:  If you were to spend the rest of your life with your urinary condition the way it is now, how would you feel about that? 0-Delighted   Urine Leakage (Incontinence) 0-No Leakage         Objective     Physical Exam:   Vital Signs:   Vitals:    10/23/24 1329   Weight: 69.7 kg (153 lb 10.6 oz)   Height: 152.4 cm (60\")     Body mass index is 30.01 kg/m².     Physical Exam  Vitals and nursing note reviewed.   Constitutional:       General: He is not in acute distress.     Appearance: Normal " appearance.   HENT:      Head: Normocephalic and atraumatic.      Right Ear: External ear normal.      Left Ear: External ear normal.      Nose: Nose normal.   Eyes:      Conjunctiva/sclera: Conjunctivae normal.   Pulmonary:      Effort: Pulmonary effort is normal. No respiratory distress.   Abdominal:      Palpations: Abdomen is soft.      Tenderness: There is no abdominal tenderness.   Musculoskeletal:         General: No deformity. Normal range of motion.      Cervical back: Normal range of motion.   Skin:     General: Skin is warm.   Neurological:      General: No focal deficit present.      Mental Status: He is alert and oriented to person, place, and time. Mental status is at baseline.   Psychiatric:         Mood and Affect: Mood normal.         Behavior: Behavior normal.         Thought Content: Thought content normal.         Judgment: Judgment normal.         Labs:   Lab Results   Component Value Date    PSA 7.910 (H) 10/21/2024    PSA 12.400 (H) 10/01/2024    PSA 4.250 (H) 04/09/2019       Brief Urine Lab Results  (Last result in the past 365 days)        Color   Clarity   Blood   Leuk Est   Nitrite   Protein   CREAT   Urine HCG        10/04/24 1346 Yellow   Clear   Negative   Negative   Negative   Trace                   Urine Culture          12/7/2023    14:05 12/22/2023    15:24 10/1/2024    13:10   Urine Culture   Urine Culture No growth  <10,000 CFU/mL Normal Urogenital Deidra  No growth         Lab Results   Component Value Date    GLUCOSE 151 (H) 10/21/2024    CALCIUM 9.6 10/21/2024     10/21/2024    K 4.3 10/21/2024    CO2 24.4 10/21/2024     10/21/2024    BUN 22 10/21/2024    CREATININE 1.79 (H) 10/21/2024    EGFRIFNONA 46 (L) 04/09/2019    BCR 12.3 10/21/2024    ANIONGAP 7.6 10/21/2024       Lab Results   Component Value Date    WBC 4.14 10/21/2024    HGB 12.3 (L) 10/21/2024    HCT 36.8 (L) 10/21/2024    MCV 89.3 10/21/2024     10/21/2024       Images:   CT Angiogram  Chest    Result Date: 10/4/2024  Segmental branch bilateral pulmonary emboli. Findings were discussed with Dr. Waterman at 6:44 p.m. on 10/4/2024 Electronically Signed: River Tompkins MD  10/4/2024 6:46 PM EDT  Workstation ID: [id number]    CT Abdomen Pelvis Without Contrast    Result Date: 10/4/2024  No renal or ureteric calculi. Stable exophytic right renal cyst. Atelectatic changes at the right lung base. No rib fractures. Trace right effusion Electronically Signed: River Tompkins MD  10/4/2024 3:50 PM EDT  Workstation ID: [id number]      Measures:   Tobacco:   Elliot Jean Baptiste  reports that he has never smoked. He has been exposed to tobacco smoke. He has never used smokeless tobacco.     Urine Incontinence: Patient reports that he is not currently experiencing any symptoms of urinary incontinence.      Assessment / Plan      Assessment:  Mr. Jean Baptiste is a 74 y.o. male who presented today with lower urinary tract symptoms.  We discussed that his PSA has appropriately down trended after a trial of antibiotics for presumed prostatitis.  Given that he has had 3 prior negative prostate biopsies and recently started apixaban for a PE we will defer additional workup for now.  We will have him return to clinic in 4 months with a repeat PSA.    Diagnoses and all orders for this visit:    1. Elevated prostate specific antigen (PSA) (Primary)  -     PSA Diagnostic; Future    2. Prostatitis, unspecified prostatitis type        Follow Up:   Follow-up in 4 months with a repeat PSA    I spent approximately 30 minutes providing clinical care for this patient; including review of patient's chart and provider documentation, face to face time spent with patient in examination room (obtaining history, performing physical exam, discussing diagnosis and management options), placing orders, and completing patient documentation.     Cheri Dubon MD  [facility] [place]

## 2024-10-25 ENCOUNTER — CONSULT (OUTPATIENT)
Dept: ONCOLOGY | Facility: CLINIC | Age: 74
End: 2024-10-25
Payer: MEDICARE

## 2024-10-25 VITALS
TEMPERATURE: 98.4 F | HEART RATE: 64 BPM | RESPIRATION RATE: 16 BRPM | HEIGHT: 61 IN | DIASTOLIC BLOOD PRESSURE: 80 MMHG | BODY MASS INDEX: 28.7 KG/M2 | SYSTOLIC BLOOD PRESSURE: 145 MMHG | OXYGEN SATURATION: 98 % | WEIGHT: 152 LBS

## 2024-10-25 DIAGNOSIS — I26.99 BILATERAL PULMONARY EMBOLISM: Primary | ICD-10-CM

## 2024-10-25 NOTE — PROGRESS NOTES
ID: 74 y.o. year old male from [facility] [id number]    PCP: Dandy Teran DO    REFERRING PHYSICIAN: Hien Valencia PA-C    Reason for Consultation: Provoked bilateral pulmonary embolism    Dear Dr. Teran and Ms. Valencia    It is a pleasure to meet Mr. Ngo today.  He is a very pleasant 74-year-old gentleman who presents today for consultation for provoked DVT and pulmonary embolism.  Patient reports that he was traveling and drove almost 8 hours a day for 2 days straight with minimal stopping.  Subsequently presented with pleuritic chest pain.  Was found to have bilateral pulmonary embolism and a DVT.  He was started on Eliquis in the hospital and has been on it for the last month.  He has no prior history of blood clots.  No family history.  He had a thrombophilia workup done while he was in the hospital that was negative.        Past Medical History:   Diagnosis Date    Allergic     Arthritis     Benign prostatic hyperplasia     Cancer     skin    Cataract     Colon polyp     Depression     Enlarged prostate     Gallstones     History of medical problems     poor blood clotting     HL (hearing loss)     Hypertension     Kidney infection     Kidney stone     Migraine     Prostatitis        Past Surgical History:   Procedure Laterality Date    APPENDECTOMY      CATARACT EXTRACTION Bilateral     CHOLECYSTECTOMY  1995    COLONOSCOPY      CYSTOSCOPY      CYSTOSCOPY TRANSURETHRAL RESECTION OF PROSTATE N/A 12/07/2023    Procedure: CYSTOSCOPY TRANSURETHRAL RESECTION OF PROSTATE GREENLIGHT;  Surgeon: Cheri Dubon MD;  Location: [facility];  Service: Urology;  Laterality: N/A;    GALLBLADDER SURGERY      HEMORRHOIDECTOMY      KIDNEY STONE SURGERY      KNEE CARTILAGE SURGERY Left     LITHOTRIPSY  1993    PROSTATE SURGERY  December 7, 2023    ULNAR NERVE REPAIR Right        Social History     Socioeconomic History    Marital status:    Tobacco Use    Smoking status: Never     Passive exposure: Past     Smokeless tobacco: Never   Vaping Use    Vaping status: Never Used   Substance and Sexual Activity    Alcohol use: Yes     Comment: social occasions    Drug use: No    Sexual activity: Not Currently     Partners: Female       Family History   Problem Relation Age of Onset    Cancer Mother         1 kidney removed    Hypertension Mother     Migraines Mother     Osteoporosis Mother     Kidney cancer Mother         Kidney removed    Hypertension Father     Hyperlipidemia Father         Bypass surgeries, valve repair    Hearing loss Father         work related needed hearing aids    Heart disease Father     Kidney disease Father         loss of kidney function    Vision loss Father         Macular degeneration    Migraines Sister     Stroke Paternal Grandmother          of a stroke       Review of Systems:    16 point review of systems was performed and reviewed and scanned into the EMR    Review of Systems - Oncology      Current Outpatient Medications:     [START ON 2024] apixaban (ELIQUIS) 5 MG tablet tablet, Take 1 tablet by mouth 2 (Two) Times a Day., Disp: 60 tablet, Rfl: 1    chlorhexidine (PERIDEX) 0.12 % solution, SWISH WITH 1/2 OUNCE FOR 30 SECONDS AND SPIT , TWO TIMES A DAY FOLLOWING BRUSHING, Disp: , Rfl:     Cholecalciferol (VITAMIN D3) 5000 units capsule capsule, Take 1 capsule by mouth Daily., Disp: , Rfl:     fluorouracil (EFUDEX) 5 % cream, APPLY A THIN LAYER TO SCALP, TEMPLES, SIDEBBURNS, AND FOREHEAD AT BEDTIME FOR 2 WEEKS. WASH OFF EACH MORNING., Disp: , Rfl:     pravastatin (PRAVACHOL) 40 MG tablet, Take 1 tablet by mouth Every Night., Disp: 90 tablet, Rfl: 3    tamsulosin (FLOMAX) 0.4 MG capsule 24 hr capsule, Take 1 capsule by mouth Daily., Disp: , Rfl:          Allergies   Allergen Reactions    Doxycycline Diarrhea     Stomach pain, diarrhea         ECOG score: 0           Objective     Vitals:    10/25/24 1401   BP: 145/80   Pulse: 64   Resp: 16   Temp: 98.4 °F (36.9 °C)   SpO2: 98%      Body mass index is 29.2 kg/m².  Body surface area is 1.67 meters squared.        10/25/24  1401   Weight: 68.9 kg (152 lb)     Pain Score    10/25/24 1401   PainSc: 0-No pain          Physical Exam    General: well appearing, in no acute distress  HEENT: sclera anicteric, oropharynx clear, neck is supple  Extremities: no lower extremity edema  Skin: no rashes, lesions, bruising, or petechiae  Msk:  Shows no weakness of the large muscle groups  Psych: Mood is stable        Lab Results   Component Value Date    GLUCOSE 151 (H) 10/21/2024    BUN 22 10/21/2024    CREATININE 1.79 (H) 10/21/2024     10/21/2024    K 4.3 10/21/2024     10/21/2024    CO2 24.4 10/21/2024    CALCIUM 9.6 10/21/2024    PROTEINTOT 6.9 10/21/2024    ALBUMIN 3.9 10/21/2024    BILITOT 1.1 10/21/2024    ALKPHOS 75 10/21/2024    AST 22 10/21/2024    ALT 18 10/21/2024       Lab Results   Component Value Date    HGB 12.3 (L) 10/21/2024    HCT 36.8 (L) 10/21/2024    MCV 89.3 10/21/2024     10/21/2024    WBC 4.14 10/21/2024    NEUTROABS 2.38 10/06/2024    LYMPHSABS 1.87 10/06/2024    MONOSABS 0.56 10/06/2024    EOSABS 0.33 10/06/2024    BASOSABS 0.04 10/06/2024       CT Angiogram Chest    Result Date: 10/4/2024  Segmental branch bilateral pulmonary emboli. Findings were discussed with Dr. Waterman at 6:44 p.m. on 10/4/2024 Electronically Signed: River Tompkins MD  10/4/2024 6:46 PM EDT  Workstation ID: [id number]    CT Abdomen Pelvis Without Contrast    Result Date: 10/4/2024  No renal or ureteric calculi. Stable exophytic right renal cyst. Atelectatic changes at the right lung base. No rib fractures. Trace right effusion Electronically Signed: River Tompkins MD  10/4/2024 3:50 PM EDT  Workstation ID: [id number]        Assessment      1.  Provoked DVT and pulmonary embolism.  I recommended at least 6 months of full dose anticoagulation before we consider discontinuation or extending for another year at lower dose of Eliquis.  If the co-pay  for Eliquis is too high then we can always consider Coumadin.  He does not need any further workup at this time.  His thrombophilia workup in the hospital was reasonable and it was negative          Thank you for allowing me to participate in the care of this patient.    Yours sincerely,    Susi Sims MD  [facility]  Hematology and Oncology    Return in (Approximately): 5 months    Orders Placed This Encounter   Procedures    CBC & Differential     Standing Status:   Future     Standing Expiration Date:   10/25/2025     Order Specific Question:   Manual Differential     Answer:   No     Order Specific Question:   Release to patient     Answer:   Routine Release [[id number]]

## 2024-12-16 DIAGNOSIS — E78.5 DYSLIPIDEMIA: ICD-10-CM

## 2024-12-16 RX ORDER — PRAVASTATIN SODIUM 40 MG
40 TABLET ORAL
Qty: 90 TABLET | Refills: 0 | Status: SHIPPED | OUTPATIENT
Start: 2024-12-16

## 2025-01-07 DIAGNOSIS — I26.99 BILATERAL PULMONARY EMBOLISM: ICD-10-CM

## 2025-01-07 DIAGNOSIS — I82.4Z3 ACUTE DEEP VEIN THROMBOSIS (DVT) OF DISTAL VEIN OF BOTH LOWER EXTREMITIES: ICD-10-CM

## 2025-02-25 ENCOUNTER — LAB (OUTPATIENT)
Dept: LAB | Facility: HOSPITAL | Age: 75
End: 2025-02-25
Payer: MEDICARE

## 2025-02-25 DIAGNOSIS — I26.99 BILATERAL PULMONARY EMBOLISM: ICD-10-CM

## 2025-02-25 DIAGNOSIS — R97.20 ELEVATED PROSTATE SPECIFIC ANTIGEN (PSA): ICD-10-CM

## 2025-02-25 LAB
BASOPHILS # BLD AUTO: 0.06 10*3/MM3 (ref 0–0.2)
BASOPHILS NFR BLD AUTO: 1 % (ref 0–1.5)
DEPRECATED RDW RBC AUTO: 36.3 FL (ref 37–54)
EOSINOPHIL # BLD AUTO: 0.28 10*3/MM3 (ref 0–0.4)
EOSINOPHIL NFR BLD AUTO: 4.5 % (ref 0.3–6.2)
ERYTHROCYTE [DISTWIDTH] IN BLOOD BY AUTOMATED COUNT: 12 % (ref 12.3–15.4)
HCT VFR BLD AUTO: 37.7 % (ref 37.5–51)
HGB BLD-MCNC: 13.4 G/DL (ref 13–17.7)
IMM GRANULOCYTES # BLD AUTO: 0.03 10*3/MM3 (ref 0–0.05)
IMM GRANULOCYTES NFR BLD AUTO: 0.5 % (ref 0–0.5)
LYMPHOCYTES # BLD AUTO: 1.84 10*3/MM3 (ref 0.7–3.1)
LYMPHOCYTES NFR BLD AUTO: 29.3 % (ref 19.6–45.3)
MCH RBC QN AUTO: 30 PG (ref 26.6–33)
MCHC RBC AUTO-ENTMCNC: 35.5 G/DL (ref 31.5–35.7)
MCV RBC AUTO: 84.3 FL (ref 79–97)
MONOCYTES # BLD AUTO: 0.52 10*3/MM3 (ref 0.1–0.9)
MONOCYTES NFR BLD AUTO: 8.3 % (ref 5–12)
NEUTROPHILS NFR BLD AUTO: 3.56 10*3/MM3 (ref 1.7–7)
NEUTROPHILS NFR BLD AUTO: 56.4 % (ref 42.7–76)
NRBC BLD AUTO-RTO: 0 /100 WBC (ref 0–0.2)
PLATELET # BLD AUTO: 164 10*3/MM3 (ref 140–450)
PMV BLD AUTO: 10.6 FL (ref 6–12)
RBC # BLD AUTO: 4.47 10*6/MM3 (ref 4.14–5.8)
WBC NRBC COR # BLD AUTO: 6.29 10*3/MM3 (ref 3.4–10.8)

## 2025-02-25 PROCEDURE — 85025 COMPLETE CBC W/AUTO DIFF WBC: CPT

## 2025-02-25 PROCEDURE — 84153 ASSAY OF PSA TOTAL: CPT

## 2025-02-25 PROCEDURE — 36415 COLL VENOUS BLD VENIPUNCTURE: CPT

## 2025-02-26 ENCOUNTER — OFFICE VISIT (OUTPATIENT)
Dept: UROLOGY | Facility: CLINIC | Age: 75
End: 2025-02-26
Payer: MEDICARE

## 2025-02-26 DIAGNOSIS — R97.20 ELEVATED PROSTATE SPECIFIC ANTIGEN (PSA): Primary | ICD-10-CM

## 2025-02-26 LAB — PSA SERPL-MCNC: 8.81 NG/ML (ref 0–4)

## 2025-02-26 PROCEDURE — 1159F MED LIST DOCD IN RCRD: CPT | Performed by: UROLOGY

## 2025-02-26 PROCEDURE — 99214 OFFICE O/P EST MOD 30 MIN: CPT | Performed by: UROLOGY

## 2025-02-26 PROCEDURE — 1160F RVW MEDS BY RX/DR IN RCRD: CPT | Performed by: UROLOGY

## 2025-02-26 NOTE — PROGRESS NOTES
Follow Up Office Visit      Patient Name: Elliot Jean Baptiste  : 1950   MRN: [id number]     Chief Complaint:    Chief Complaint   Patient presents with    Elevated PSA       Referring Provider: No ref. provider found    History of Present Illness: Elliot Jean Baptiste is a 75 y.o. male who presents today for follow up of  elevated PSA.  He reports no changes since his last visit.  He is on Eliquis for clots and is hoping to come off his Eliquis next month.  He reports no dysuria or gross hematuria.  Voiding well.  He has had three negative biopsies previously.  He had Greenlight in  and has been voiding well since.      His PSA has been steady between 6-8 and as high as 16 in the past.      Subjective      Review of System:   Review of Systems   I have reviewed the ROS documented by my clinical staff, I have updated appropriately and I agree. Cheri Dubon MD    I have reviewed and the following portions of the patient's history were updated as appropriate: past family history, past medical history, past social history, past surgical history and problem list.    Past Medical History:   Past Medical History:   Diagnosis Date    Allergic     Arthritis     Benign prostatic hyperplasia     Cancer     skin    Cataract     Colon polyp     Depression     Enlarged prostate     Gallstones     History of medical problems     poor blood clotting     HL (hearing loss)     Hypertension     Kidney infection     Kidney stone     Migraine     Prostatitis        Past Surgical History:  Past Surgical History:   Procedure Laterality Date    APPENDECTOMY      CATARACT EXTRACTION Bilateral     CHOLECYSTECTOMY      COLONOSCOPY      CYSTOSCOPY      CYSTOSCOPY TRANSURETHRAL RESECTION OF PROSTATE N/A 2023    Procedure: CYSTOSCOPY TRANSURETHRAL RESECTION OF PROSTATE GREENLIGHT;  Surgeon: Cheri Dubon MD;  Location: [facility];  Service: Urology;  Laterality: N/A;    GALLBLADDER SURGERY      HEMORRHOIDECTOMY      KIDNEY  STONE SURGERY      KNEE CARTILAGE SURGERY Left     LITHOTRIPSY  1993    PROSTATE SURGERY  December 7, 2023    ULNAR NERVE REPAIR Right        Family History:   family history includes Cancer in his mother; Hearing loss in his father; Heart disease in his father; Hyperlipidemia in his father; Hypertension in his father and mother; Kidney cancer in his mother; Kidney disease in his father; Migraines in his mother and sister; Osteoporosis in his mother; Stroke in his paternal grandmother; Vision loss in his father.   Otherwise pertinent FHx was reviewed and not pertinent to current issue.    Social History:    reports that he has never smoked. He has been exposed to tobacco smoke. He has never used smokeless tobacco. He reports current alcohol use. He reports that he does not use drugs.    Medications:     Current Outpatient Medications:     apixaban (ELIQUIS) 5 MG tablet tablet, Take 1 tablet by mouth 2 (Two) Times a Day., Disp: 60 tablet, Rfl: 1    chlorhexidine (PERIDEX) 0.12 % solution, SWISH WITH 1/2 OUNCE FOR 30 SECONDS AND SPIT , TWO TIMES A DAY FOLLOWING BRUSHING, Disp: , Rfl:     Cholecalciferol (VITAMIN D3) 5000 units capsule capsule, Take 1 capsule by mouth Daily., Disp: , Rfl:     pravastatin (PRAVACHOL) 40 MG tablet, TAKE 1 TABLET BY MOUTH EVERY DAY AT NIGHT, Disp: 90 tablet, Rfl: 0    tamsulosin (FLOMAX) 0.4 MG capsule 24 hr capsule, Take 1 capsule by mouth Daily., Disp: , Rfl:     fluorouracil (EFUDEX) 5 % cream, APPLY A THIN LAYER TO SCALP, TEMPLES, SIDEBBURNS, AND FOREHEAD AT BEDTIME FOR 2 WEEKS. WASH OFF EACH MORNING. (Patient not taking: Reported on 2/26/2025), Disp: , Rfl:     Allergies:   Allergies   Allergen Reactions    Doxycycline Diarrhea     Stomach pain, diarrhea       IPSS Questionnaire (AUA-7):  Over the past month…    1)  Incomplete Emptying  How often have you had a sensation of not emptying your bladder?  0 - Not at all   2)  Frequency  How often have you had to urinate less than every  two hours? 0 - Not at all   3)  Intermittency  How often have you found you stopped and started again several times when you urinated?  1 - Less than 1 time in 5   4) Urgency  How often have you found it difficult to postpone urination?  0 - Not at all   5) Weak Stream  How often have you had a weak urinary stream?  0 - Not at all   6) Straining  How often have you had to push or strain to begin urination?  0 - Not at all   7) Nocturia  How many times did you typically get up at night to urinate?  1 - 1 time   Total Score:  2   The International Prostate Symptom Score (IPSS) is used to screen, diagnose, track symptoms of benign prostatic hyperplasia (BPH).    0-7 pts (Mild Symptoms)  / 8-19 pts (Moderate) / 20-35 (Severe)    Quality of life due to urinary symptoms:  If you were to spend the rest of your life with your urinary condition the way it is now, how would you feel about that? 0-Delighted   Urine Leakage (Incontinence) 0-No Leakage       Objective     Physical Exam:   Vital Signs: There were no vitals filed for this visit.  There is no height or weight on file to calculate BMI.     Physical Exam  Vitals and nursing note reviewed.   Constitutional:       General: He is awake. He is not in acute distress.     Appearance: Normal appearance. He is well-developed.   HENT:      Head: Normocephalic and atraumatic.      Right Ear: External ear normal.      Left Ear: External ear normal.      Nose: Nose normal.   Eyes:      Conjunctiva/sclera: Conjunctivae normal.   Pulmonary:      Effort: Pulmonary effort is normal.   Abdominal:      General: There is no distension.      Palpations: Abdomen is soft. There is no mass.      Tenderness: There is no abdominal tenderness. There is no right CVA tenderness, left CVA tenderness, guarding or rebound.      Hernia: No hernia is present. There is no hernia in the left inguinal area or right inguinal area.   Genitourinary:     Pubic Area: No rash.       Rectum: No mass or  tenderness. Normal anal tone.   Musculoskeletal:      Cervical back: Normal range of motion.   Lymphadenopathy:      Lower Body: No right inguinal adenopathy. No left inguinal adenopathy.   Skin:     General: Skin is warm.   Neurological:      General: No focal deficit present.      Mental Status: He is alert and oriented to person, place, and time.   Psychiatric:         Behavior: Behavior normal. Behavior is cooperative.         Labs:   Brief Urine Lab Results  (Last result in the past 365 days)        Color   Clarity   Blood   Leuk Est   Nitrite   Protein   CREAT   Urine HCG        10/04/24 1346 Yellow   Clear   Negative   Negative   Negative   Trace                   Urine Culture          10/1/2024    13:10   Urine Culture   Urine Culture No growth         Lab Results   Component Value Date    GLUCOSE 151 (H) 10/21/2024    CALCIUM 9.6 10/21/2024     10/21/2024    K 4.3 10/21/2024    CO2 24.4 10/21/2024     10/21/2024    BUN 22 10/21/2024    CREATININE 1.79 (H) 10/21/2024    EGFRIFNONA 46 (L) 04/09/2019    BCR 12.3 10/21/2024    ANIONGAP 7.6 10/21/2024       Lab Results   Component Value Date    WBC 6.29 02/25/2025    HGB 13.4 02/25/2025    HCT 37.7 02/25/2025    MCV 84.3 02/25/2025     02/25/2025       Lab Results   Component Value Date    PSA 8.810 (H) 02/25/2025    PSA 7.910 (H) 10/21/2024    PSA 12.400 (H) 10/01/2024       Images:   No Images in the past 120 days found..    Measures:   Tobacco:   Elliot Jean Baptiste  reports that he has never smoked. He has been exposed to tobacco smoke. He has never used smokeless tobacco.       Urine Incontinence: Patient reports that he is not currently experiencing any symptoms of urinary incontinence.         Assessment / Plan      Assessment/Plan:   75 y.o. male who presented today for follow up of elevated PSA and is s/p Greenlight PVP in 2023.  His PSA has gone up slightly but is still less than what his previous PSA have been.  He has undergone at  least 3 biopsies and possibly 4 in the past which have been negative.  We will continue to monitor for now.      Diagnoses and all orders for this visit:    1. Elevated prostate specific antigen (PSA) (Primary)  -     PSA DIAGNOSTIC; Future         Follow Up:   Return in about 6 months (around 8/26/2025) for Recheck.    I spent approximately 30 minutes providing clinical care for this patient; including review of patient's chart and provider documentation, face to face time spent with patient in examination room (obtaining history, performing physical exam, discussing diagnosis and management options), placing orders, and completing patient documentation.     Cheri Dubon MD  [facility] [place]

## 2025-03-17 DIAGNOSIS — I26.99 BILATERAL PULMONARY EMBOLISM: ICD-10-CM

## 2025-03-17 DIAGNOSIS — E78.5 DYSLIPIDEMIA: ICD-10-CM

## 2025-03-17 DIAGNOSIS — I82.4Z3 ACUTE DEEP VEIN THROMBOSIS (DVT) OF DISTAL VEIN OF BOTH LOWER EXTREMITIES: ICD-10-CM

## 2025-03-17 RX ORDER — APIXABAN 5 MG/1
5 TABLET, FILM COATED ORAL 2 TIMES DAILY
Qty: 60 TABLET | Refills: 0 | Status: SHIPPED | OUTPATIENT
Start: 2025-03-17

## 2025-03-17 RX ORDER — PRAVASTATIN SODIUM 40 MG
40 TABLET ORAL
Qty: 90 TABLET | Refills: 0 | Status: SHIPPED | OUTPATIENT
Start: 2025-03-17

## 2025-03-28 ENCOUNTER — LAB (OUTPATIENT)
Dept: LAB | Facility: HOSPITAL | Age: 75
End: 2025-03-28
Payer: MEDICARE

## 2025-03-28 ENCOUNTER — OFFICE VISIT (OUTPATIENT)
Dept: ONCOLOGY | Facility: CLINIC | Age: 75
End: 2025-03-28
Payer: MEDICARE

## 2025-03-28 VITALS
SYSTOLIC BLOOD PRESSURE: 184 MMHG | TEMPERATURE: 97.1 F | OXYGEN SATURATION: 98 % | HEIGHT: 61 IN | HEART RATE: 64 BPM | DIASTOLIC BLOOD PRESSURE: 95 MMHG | WEIGHT: 152 LBS | BODY MASS INDEX: 28.7 KG/M2

## 2025-03-28 DIAGNOSIS — I26.99 BILATERAL PULMONARY EMBOLISM: Primary | ICD-10-CM

## 2025-03-28 DIAGNOSIS — I82.4Z3 ACUTE DEEP VEIN THROMBOSIS (DVT) OF DISTAL VEIN OF BOTH LOWER EXTREMITIES: ICD-10-CM

## 2025-03-28 DIAGNOSIS — I26.99 BILATERAL PULMONARY EMBOLISM: ICD-10-CM

## 2025-03-28 LAB
BASOPHILS # BLD AUTO: 0.02 10*3/MM3 (ref 0–0.2)
BASOPHILS NFR BLD AUTO: 0.3 % (ref 0–1.5)
DEPRECATED RDW RBC AUTO: 37.9 FL (ref 37–54)
EOSINOPHIL # BLD AUTO: 0.28 10*3/MM3 (ref 0–0.4)
EOSINOPHIL NFR BLD AUTO: 4.8 % (ref 0.3–6.2)
ERYTHROCYTE [DISTWIDTH] IN BLOOD BY AUTOMATED COUNT: 11.9 % (ref 12.3–15.4)
HCT VFR BLD AUTO: 39.4 % (ref 37.5–51)
HGB BLD-MCNC: 13.9 G/DL (ref 13–17.7)
IMM GRANULOCYTES # BLD AUTO: 0.02 10*3/MM3 (ref 0–0.05)
IMM GRANULOCYTES NFR BLD AUTO: 0.3 % (ref 0–0.5)
LYMPHOCYTES # BLD AUTO: 1.9 10*3/MM3 (ref 0.7–3.1)
LYMPHOCYTES NFR BLD AUTO: 32.4 % (ref 19.6–45.3)
MCH RBC QN AUTO: 30.5 PG (ref 26.6–33)
MCHC RBC AUTO-ENTMCNC: 35.3 G/DL (ref 31.5–35.7)
MCV RBC AUTO: 86.6 FL (ref 79–97)
MONOCYTES # BLD AUTO: 0.47 10*3/MM3 (ref 0.1–0.9)
MONOCYTES NFR BLD AUTO: 8 % (ref 5–12)
NEUTROPHILS NFR BLD AUTO: 3.17 10*3/MM3 (ref 1.7–7)
NEUTROPHILS NFR BLD AUTO: 54.2 % (ref 42.7–76)
PLATELET # BLD AUTO: 170 10*3/MM3 (ref 140–450)
PMV BLD AUTO: 10.5 FL (ref 6–12)
RBC # BLD AUTO: 4.55 10*6/MM3 (ref 4.14–5.8)
WBC NRBC COR # BLD AUTO: 5.86 10*3/MM3 (ref 3.4–10.8)

## 2025-03-28 PROCEDURE — 36415 COLL VENOUS BLD VENIPUNCTURE: CPT

## 2025-03-28 PROCEDURE — 85025 COMPLETE CBC W/AUTO DIFF WBC: CPT

## 2025-03-28 NOTE — LETTER
March 28, 2025     Dandy Teran DO  [address] [facility] [address]    Patient: Elliot Jean Baptiste   YOB: 1950   Date of Visit: 3/28/2025     Dear Dandy Teran DO:       Thank you for referring Elliot Jean Baptiste to me for evaluation. Below are the relevant portions of my assessment and plan of care.    If you have questions, please do not hesitate to call me. I look forward to following Elliot along with you.         Sincerely,        Susi Sims MD        CC: Susi Leo MD  03/28/25 1449  Sign when Signing Visit    REASON FOR VISIT: Provoked DVT and pulmonary embolism in October 2024    HISTORY OF PRESENT ILLNESS:   75 y.o.  male presents today for follow-up.  He is currently on Eliquis.  He has completed 6 months of anticoagulation.  Tolerated well.  Wearing compression socks and ambulating.    Past medical history, social history and family history was reviewed 03/28/25 and unchanged from prior visit.    Review of Systems:    Review of Systems - Oncology         Medications:        Current Outpatient Medications:   •  chlorhexidine (PERIDEX) 0.12 % solution, SWISH WITH 1/2 OUNCE FOR 30 SECONDS AND SPIT , TWO TIMES A DAY FOLLOWING BRUSHING, Disp: , Rfl:   •  Cholecalciferol (VITAMIN D3) 5000 units capsule capsule, Take 1 capsule by mouth Daily., Disp: , Rfl:   •  Eliquis 5 MG tablet tablet, TAKE 1 TABLET BY MOUTH 2 TIMES A DAY, Disp: 60 tablet, Rfl: 0  •  fluorouracil (EFUDEX) 5 % cream, APPLY A THIN LAYER TO SCALP, TEMPLES, SIDEBBURNS, AND FOREHEAD AT BEDTIME FOR 2 WEEKS. WASH OFF EACH MORNING. (Patient not taking: Reported on 2/26/2025), Disp: , Rfl:   •  pravastatin (PRAVACHOL) 40 MG tablet, TAKE 1 TABLET BY MOUTH EVERY DAY AT NIGHT, Disp: 90 tablet, Rfl: 0  •  tamsulosin (FLOMAX) 0.4 MG capsule 24 hr capsule, Take 1 capsule by mouth Daily., Disp: , Rfl:            ALLERGIES:    Allergies   Allergen Reactions   • Doxycycline Diarrhea      "Stomach pain, diarrhea         Physical Exam    VITAL SIGNS:  BP (!) 184/95   Pulse 64   Temp 97.1 °F (36.2 °C) (Infrared)   Ht 153.7 cm (60.51\")   Wt 68.9 kg (152 lb)   SpO2 98%   BMI 29.19 kg/m²     ECOG score: 0           Wt Readings from Last 3 Encounters:   03/28/25 68.9 kg (152 lb)   10/25/24 68.9 kg (152 lb)   10/23/24 69.7 kg (153 lb 10.6 oz)       Body mass index is 29.19 kg/m². Body surface area is 1.67 meters squared.       Performance Status: 0    General: well appearing, in no acute distress  HEENT: sclera anicteric, neck is supple  Lymphatics: no cervical, supraclavicular, or axillary adenopathy  Cardiovascular: regular rate and rhythm, no murmurs, rubs or gallops  Lungs: clear to auscultation bilaterally  Abdomen: soft, nontender, nondistended.  No palpable organomegaly  Extremities: no lower extremity edema  Skin: no rashes, lesions, bruising, or petechiae  Msk:  Shows no weakness of the large muscle groups  Psych: Mood is stable        RECENT LABS:    Lab Results   Component Value Date    HGB 13.9 03/28/2025    HCT 39.4 03/28/2025    MCV 86.6 03/28/2025     03/28/2025    WBC 5.86 03/28/2025    NEUTROABS 3.17 03/28/2025    LYMPHSABS 1.90 03/28/2025    MONOSABS 0.47 03/28/2025    EOSABS 0.28 03/28/2025    BASOSABS 0.02 03/28/2025       Lab Results   Component Value Date    GLUCOSE 151 (H) 10/21/2024    BUN 22 10/21/2024    CREATININE 1.79 (H) 10/21/2024     10/21/2024    K 4.3 10/21/2024     10/21/2024    CO2 24.4 10/21/2024    CALCIUM 9.6 10/21/2024    PROTEINTOT 6.9 10/21/2024    ALBUMIN 3.9 10/21/2024    BILITOT 1.1 10/21/2024    ALKPHOS 75 10/21/2024    AST 22 10/21/2024    ALT 18 10/21/2024       No Images in the past 120 days found..        Assessment/Plan    1.  Provoked DVT and pulmonary embolism.  He has completed 6 months of anticoagulation.  Did well.  I recommended stopping his anticoagulation.  He can consider a baby aspirin since he does travel a fair bit.  But " otherwise no other intervention.  Recommend frequent stops, maintaining hydration and compression socks.      Susi Sims MD  [facility]    Return in (Approximately): PRN    No orders of the defined types were placed in this encounter.      3/28/2025     Future Appointments         Provider Department Center    8/27/2025 8:40 AM (Arrive by 8:25 AM) Cheri Dubon MD [facility] [place]

## 2025-03-28 NOTE — PROGRESS NOTES
"  REASON FOR VISIT: Provoked DVT and pulmonary embolism in October 2024    HISTORY OF PRESENT ILLNESS:   75 y.o.  male presents today for follow-up.  He is currently on Eliquis.  He has completed 6 months of anticoagulation.  Tolerated well.  Wearing compression socks and ambulating.    Past medical history, social history and family history was reviewed 03/28/25 and unchanged from prior visit.    Review of Systems:    Review of Systems - Oncology         Medications:        Current Outpatient Medications:     chlorhexidine (PERIDEX) 0.12 % solution, SWISH WITH 1/2 OUNCE FOR 30 SECONDS AND SPIT , TWO TIMES A DAY FOLLOWING BRUSHING, Disp: , Rfl:     Cholecalciferol (VITAMIN D3) 5000 units capsule capsule, Take 1 capsule by mouth Daily., Disp: , Rfl:     Eliquis 5 MG tablet tablet, TAKE 1 TABLET BY MOUTH 2 TIMES A DAY, Disp: 60 tablet, Rfl: 0    fluorouracil (EFUDEX) 5 % cream, APPLY A THIN LAYER TO SCALP, TEMPLES, SIDEBBURNS, AND FOREHEAD AT BEDTIME FOR 2 WEEKS. WASH OFF EACH MORNING. (Patient not taking: Reported on 2/26/2025), Disp: , Rfl:     pravastatin (PRAVACHOL) 40 MG tablet, TAKE 1 TABLET BY MOUTH EVERY DAY AT NIGHT, Disp: 90 tablet, Rfl: 0    tamsulosin (FLOMAX) 0.4 MG capsule 24 hr capsule, Take 1 capsule by mouth Daily., Disp: , Rfl:            ALLERGIES:    Allergies   Allergen Reactions    Doxycycline Diarrhea     Stomach pain, diarrhea         Physical Exam    VITAL SIGNS:  BP (!) 184/95   Pulse 64   Temp 97.1 °F (36.2 °C) (Infrared)   Ht 153.7 cm (60.51\")   Wt 68.9 kg (152 lb)   SpO2 98%   BMI 29.19 kg/m²     ECOG score: 0           Wt Readings from Last 3 Encounters:   03/28/25 68.9 kg (152 lb)   10/25/24 68.9 kg (152 lb)   10/23/24 69.7 kg (153 lb 10.6 oz)       Body mass index is 29.19 kg/m². Body surface area is 1.67 meters squared.       Performance Status: 0    General: well appearing, in no acute distress  HEENT: sclera anicteric, neck is supple  Lymphatics: no cervical, supraclavicular, " or axillary adenopathy  Cardiovascular: regular rate and rhythm, no murmurs, rubs or gallops  Lungs: clear to auscultation bilaterally  Abdomen: soft, nontender, nondistended.  No palpable organomegaly  Extremities: no lower extremity edema  Skin: no rashes, lesions, bruising, or petechiae  Msk:  Shows no weakness of the large muscle groups  Psych: Mood is stable        RECENT LABS:    Lab Results   Component Value Date    HGB 13.9 03/28/2025    HCT 39.4 03/28/2025    MCV 86.6 03/28/2025     03/28/2025    WBC 5.86 03/28/2025    NEUTROABS 3.17 03/28/2025    LYMPHSABS 1.90 03/28/2025    MONOSABS 0.47 03/28/2025    EOSABS 0.28 03/28/2025    BASOSABS 0.02 03/28/2025       Lab Results   Component Value Date    GLUCOSE 151 (H) 10/21/2024    BUN 22 10/21/2024    CREATININE 1.79 (H) 10/21/2024     10/21/2024    K 4.3 10/21/2024     10/21/2024    CO2 24.4 10/21/2024    CALCIUM 9.6 10/21/2024    PROTEINTOT 6.9 10/21/2024    ALBUMIN 3.9 10/21/2024    BILITOT 1.1 10/21/2024    ALKPHOS 75 10/21/2024    AST 22 10/21/2024    ALT 18 10/21/2024       No Images in the past 120 days found..        Assessment/Plan    1.  Provoked DVT and pulmonary embolism.  He has completed 6 months of anticoagulation.  Did well.  I recommended stopping his anticoagulation.  He can consider a baby aspirin since he does travel a fair bit.  But otherwise no other intervention.  Recommend frequent stops, maintaining hydration and compression socks.      Susi Sims MD  [facility]    Return in (Approximately): PRN    No orders of the defined types were placed in this encounter.      3/28/2025     Future Appointments         Provider Department Center    8/27/2025 8:40 AM (Arrive by 8:25 AM) Cheri Dubon MD [facility] [place]

## 2025-06-23 DIAGNOSIS — E78.5 DYSLIPIDEMIA: ICD-10-CM

## 2025-06-23 RX ORDER — PRAVASTATIN SODIUM 40 MG
40 TABLET ORAL
Qty: 90 TABLET | Refills: 0 | Status: SHIPPED | OUTPATIENT
Start: 2025-06-23

## 2025-06-23 NOTE — TELEPHONE ENCOUNTER
Caller: Elliot Jean Baptiste    Relationship: Self    Best call back number: [phone]    Requested Prescriptions:   Requested Prescriptions     Pending Prescriptions Disp Refills    pravastatin (PRAVACHOL) 40 MG tablet 90 tablet 0     Sig: Take 1 tablet by mouth every night at bedtime.        Pharmacy where request should be sent: [id number] - [place], [place] - [address] - [phone] PH - [phone] FX     Last office visit with prescribing clinician: 10/1/2024   Last telemedicine visit with prescribing clinician: Visit date not found   Next office visit with prescribing clinician: Visit date not found     Additional details provided by patient: ALMOST OUT    Does the patient have less than a 3 day supply:  [x] Yes  [] No         Savannah Delvalle MA   06/23/25 10:35 EDT

## 2025-07-08 ENCOUNTER — CLINICAL SUPPORT (OUTPATIENT)
Dept: UROLOGY | Facility: CLINIC | Age: 75
End: 2025-07-08
Payer: MEDICARE

## 2025-07-08 DIAGNOSIS — N30.00 ACUTE CYSTITIS WITHOUT HEMATURIA: Primary | ICD-10-CM

## 2025-07-08 LAB
BILIRUB BLD-MCNC: NEGATIVE MG/DL
CLARITY, POC: CLEAR
COLOR UR: YELLOW
EXPIRATION DATE: NORMAL
GLUCOSE UR STRIP-MCNC: NEGATIVE MG/DL
KETONES UR QL: NEGATIVE
LEUKOCYTE EST, POC: NEGATIVE
Lab: NORMAL
NITRITE UR-MCNC: NEGATIVE MG/ML
PH UR: 5.5 [PH] (ref 5–8)
PROT UR STRIP-MCNC: NEGATIVE MG/DL
RBC # UR STRIP: NEGATIVE /UL
SP GR UR: 1.02 (ref 1–1.03)
UROBILINOGEN UR QL: NORMAL

## 2025-07-08 PROCEDURE — 81003 URINALYSIS AUTO W/O SCOPE: CPT | Performed by: UROLOGY

## 2025-07-09 ENCOUNTER — TRANSCRIBE ORDERS (OUTPATIENT)
Dept: LAB | Facility: HOSPITAL | Age: 75
End: 2025-07-09
Payer: MEDICARE

## 2025-07-09 DIAGNOSIS — N30.00 ACUTE CYSTITIS WITHOUT HEMATURIA: Primary | ICD-10-CM

## 2025-07-10 ENCOUNTER — LAB (OUTPATIENT)
Dept: LAB | Facility: HOSPITAL | Age: 75
End: 2025-07-10
Payer: MEDICARE

## 2025-07-10 DIAGNOSIS — N30.00 ACUTE CYSTITIS WITHOUT HEMATURIA: ICD-10-CM

## 2025-07-10 PROCEDURE — 87086 URINE CULTURE/COLONY COUNT: CPT

## 2025-07-11 LAB — BACTERIA SPEC AEROBE CULT: NO GROWTH

## 2025-08-28 ENCOUNTER — LAB (OUTPATIENT)
Dept: LAB | Facility: HOSPITAL | Age: 75
End: 2025-08-28
Payer: MEDICARE

## 2025-08-28 DIAGNOSIS — R97.20 ELEVATED PROSTATE SPECIFIC ANTIGEN (PSA): ICD-10-CM

## 2025-08-28 LAB — PSA SERPL-MCNC: 6.93 NG/ML (ref 0–4)

## 2025-08-28 PROCEDURE — 84153 ASSAY OF PSA TOTAL: CPT

## 2025-08-28 PROCEDURE — 36415 COLL VENOUS BLD VENIPUNCTURE: CPT

## (undated) DEVICE — PK CYSTO-TUR BASIC 10

## (undated) DEVICE — ST PRIM GRVTY NDLESS 3 INJ PORT 105IN

## (undated) DEVICE — CATH FOL BARDEX 2WY 20F 30CC

## (undated) DEVICE — DRAINBAG,ANTI-REFLUX TOWER,L/F,2000ML,LL: Brand: MEDLINE

## (undated) DEVICE — GLV SURG BIOGEL LTX PF 7